# Patient Record
Sex: MALE | Race: BLACK OR AFRICAN AMERICAN | NOT HISPANIC OR LATINO | Employment: UNEMPLOYED | RURAL
[De-identification: names, ages, dates, MRNs, and addresses within clinical notes are randomized per-mention and may not be internally consistent; named-entity substitution may affect disease eponyms.]

---

## 2024-01-20 ENCOUNTER — HOSPITAL ENCOUNTER (INPATIENT)
Facility: HOSPITAL | Age: 44
LOS: 2 days | Discharge: HOME | DRG: 505 | End: 2024-01-23
Attending: EMERGENCY MEDICINE | Admitting: INTERNAL MEDICINE

## 2024-01-20 DIAGNOSIS — F17.200 TOBACCO DEPENDENCE: ICD-10-CM

## 2024-01-20 DIAGNOSIS — I73.9 PERIPHERAL VASCULAR DISEASE: Primary | ICD-10-CM

## 2024-01-20 DIAGNOSIS — R52 PAIN: ICD-10-CM

## 2024-01-20 DIAGNOSIS — M86.171 OTHER ACUTE OSTEOMYELITIS OF RIGHT FOOT: ICD-10-CM

## 2024-01-20 DIAGNOSIS — Z59.89 DOES NOT HAVE HEALTH INSURANCE: ICD-10-CM

## 2024-01-20 DIAGNOSIS — R07.9 CHEST PAIN: ICD-10-CM

## 2024-01-20 DIAGNOSIS — M79.671 FOOT PAIN, RIGHT: ICD-10-CM

## 2024-01-20 DIAGNOSIS — M86.071 ACUTE HEMATOGENOUS OSTEOMYELITIS OF RIGHT FOOT: ICD-10-CM

## 2024-01-20 DIAGNOSIS — M79.672 FOOT PAIN, LEFT: ICD-10-CM

## 2024-01-20 PROCEDURE — 82550 ASSAY OF CK (CPK): CPT | Performed by: EMERGENCY MEDICINE

## 2024-01-20 PROCEDURE — 85025 COMPLETE CBC W/AUTO DIFF WBC: CPT | Performed by: EMERGENCY MEDICINE

## 2024-01-20 PROCEDURE — 85730 THROMBOPLASTIN TIME PARTIAL: CPT | Performed by: EMERGENCY MEDICINE

## 2024-01-20 PROCEDURE — 85610 PROTHROMBIN TIME: CPT | Performed by: EMERGENCY MEDICINE

## 2024-01-20 PROCEDURE — 80053 COMPREHEN METABOLIC PANEL: CPT | Performed by: EMERGENCY MEDICINE

## 2024-01-20 PROCEDURE — 83874 ASSAY OF MYOGLOBIN: CPT | Performed by: EMERGENCY MEDICINE

## 2024-01-20 PROCEDURE — 99285 EMERGENCY DEPT VISIT HI MDM: CPT

## 2024-01-20 PROCEDURE — 99285 EMERGENCY DEPT VISIT HI MDM: CPT | Mod: ,,, | Performed by: EMERGENCY MEDICINE

## 2024-01-20 SDOH — SOCIAL DETERMINANTS OF HEALTH (SDOH): OTHER PROBLEMS RELATED TO HOUSING AND ECONOMIC CIRCUMSTANCES: Z59.89

## 2024-01-21 PROBLEM — F17.200 TOBACCO DEPENDENCE: Status: ACTIVE | Noted: 2024-01-21

## 2024-01-21 PROBLEM — M79.672 FOOT PAIN, LEFT: Status: ACTIVE | Noted: 2024-01-21

## 2024-01-21 PROBLEM — M86.071 ACUTE HEMATOGENOUS OSTEOMYELITIS OF RIGHT FOOT: Status: ACTIVE | Noted: 2024-01-21

## 2024-01-21 PROBLEM — I73.9 PERIPHERAL VASCULAR DISEASE: Status: ACTIVE | Noted: 2024-01-21

## 2024-01-21 PROBLEM — M79.671 FOOT PAIN, RIGHT: Status: ACTIVE | Noted: 2024-01-21

## 2024-01-21 LAB
ALBUMIN SERPL BCP-MCNC: 3.4 G/DL (ref 3.5–5)
ALBUMIN/GLOB SERPL: 1 {RATIO}
ALP SERPL-CCNC: 67 U/L (ref 45–115)
ALT SERPL W P-5'-P-CCNC: 25 U/L (ref 16–61)
ANION GAP SERPL CALCULATED.3IONS-SCNC: 9 MMOL/L (ref 7–16)
APTT PPP: 35.3 SECONDS (ref 25.2–37.3)
AST SERPL W P-5'-P-CCNC: 13 U/L (ref 15–37)
BASOPHILS # BLD AUTO: 0.03 K/UL (ref 0–0.2)
BASOPHILS NFR BLD AUTO: 0.3 % (ref 0–1)
BILIRUB SERPL-MCNC: 0.3 MG/DL (ref ?–1.2)
BUN SERPL-MCNC: 13 MG/DL (ref 7–18)
BUN/CREAT SERPL: 12 (ref 6–20)
CALCIUM SERPL-MCNC: 9 MG/DL (ref 8.5–10.1)
CHLORIDE SERPL-SCNC: 107 MMOL/L (ref 98–107)
CHOLEST SERPL-MCNC: 188 MG/DL (ref 0–200)
CHOLEST/HDLC SERPL: 4.8 {RATIO}
CK SERPL-CCNC: 227 U/L (ref 39–308)
CO2 SERPL-SCNC: 26 MMOL/L (ref 21–32)
CREAT SERPL-MCNC: 1.08 MG/DL (ref 0.7–1.3)
CRP SERPL-MCNC: <0.29 MG/DL (ref 0–0.8)
DIFFERENTIAL METHOD BLD: ABNORMAL
EGFR (NO RACE VARIABLE) (RUSH/TITUS): 88 ML/MIN/1.73M2
EOSINOPHIL # BLD AUTO: 0.19 K/UL (ref 0–0.5)
EOSINOPHIL NFR BLD AUTO: 2 % (ref 1–4)
ERYTHROCYTE [DISTWIDTH] IN BLOOD BY AUTOMATED COUNT: 14.1 % (ref 11.5–14.5)
ERYTHROCYTE [SEDIMENTATION RATE] IN BLOOD BY WESTERGREN METHOD: 4 MM/HR (ref 0–15)
GLOBULIN SER-MCNC: 3.3 G/DL (ref 2–4)
GLUCOSE SERPL-MCNC: 105 MG/DL (ref 74–106)
HCT VFR BLD AUTO: 40.3 % (ref 40–54)
HDLC SERPL-MCNC: 39 MG/DL (ref 40–60)
HGB BLD-MCNC: 13.5 G/DL (ref 13.5–18)
IMM GRANULOCYTES # BLD AUTO: 0.02 K/UL (ref 0–0.04)
IMM GRANULOCYTES NFR BLD: 0.2 % (ref 0–0.4)
INR BLD: 1
LDLC SERPL CALC-MCNC: 122 MG/DL
LDLC/HDLC SERPL: 3.1 {RATIO}
LYMPHOCYTES # BLD AUTO: 2.95 K/UL (ref 1–4.8)
LYMPHOCYTES NFR BLD AUTO: 30.5 % (ref 27–41)
MCH RBC QN AUTO: 28.4 PG (ref 27–31)
MCHC RBC AUTO-ENTMCNC: 33.5 G/DL (ref 32–36)
MCV RBC AUTO: 84.8 FL (ref 80–96)
MONOCYTES # BLD AUTO: 0.71 K/UL (ref 0–0.8)
MONOCYTES NFR BLD AUTO: 7.3 % (ref 2–6)
MPC BLD CALC-MCNC: 9.5 FL (ref 9.4–12.4)
MYOGLOBIN SERPL-MCNC: 51 NG/ML (ref 16–116)
NEUTROPHILS # BLD AUTO: 5.76 K/UL (ref 1.8–7.7)
NEUTROPHILS NFR BLD AUTO: 59.7 % (ref 53–65)
NONHDLC SERPL-MCNC: 149 MG/DL
NRBC # BLD AUTO: 0 X10E3/UL
NRBC, AUTO (.00): 0 %
PLATELET # BLD AUTO: 329 K/UL (ref 150–400)
POTASSIUM SERPL-SCNC: 3.5 MMOL/L (ref 3.5–5.1)
PROT SERPL-MCNC: 6.7 G/DL (ref 6.4–8.2)
PROTHROMBIN TIME: 13.1 SECONDS (ref 11.7–14.7)
RBC # BLD AUTO: 4.75 M/UL (ref 4.6–6.2)
SODIUM SERPL-SCNC: 138 MMOL/L (ref 136–145)
TRIGL SERPL-MCNC: 135 MG/DL (ref 35–150)
VLDLC SERPL-MCNC: 27 MG/DL
WBC # BLD AUTO: 9.66 K/UL (ref 4.5–11)

## 2024-01-21 PROCEDURE — 80061 LIPID PANEL: CPT | Performed by: INTERNAL MEDICINE

## 2024-01-21 PROCEDURE — 99222 1ST HOSP IP/OBS MODERATE 55: CPT | Mod: ,,, | Performed by: SURGERY

## 2024-01-21 PROCEDURE — 25000003 PHARM REV CODE 250: Performed by: INTERNAL MEDICINE

## 2024-01-21 PROCEDURE — 63600175 PHARM REV CODE 636 W HCPCS: Mod: JZ,JG | Performed by: INTERNAL MEDICINE

## 2024-01-21 PROCEDURE — 96372 THER/PROPH/DIAG INJ SC/IM: CPT | Performed by: EMERGENCY MEDICINE

## 2024-01-21 PROCEDURE — 86140 C-REACTIVE PROTEIN: CPT | Performed by: INTERNAL MEDICINE

## 2024-01-21 PROCEDURE — 63600175 PHARM REV CODE 636 W HCPCS: Performed by: EMERGENCY MEDICINE

## 2024-01-21 PROCEDURE — 85651 RBC SED RATE NONAUTOMATED: CPT | Performed by: INTERNAL MEDICINE

## 2024-01-21 PROCEDURE — 25000003 PHARM REV CODE 250: Performed by: EMERGENCY MEDICINE

## 2024-01-21 PROCEDURE — 87040 BLOOD CULTURE FOR BACTERIA: CPT | Performed by: EMERGENCY MEDICINE

## 2024-01-21 PROCEDURE — 99223 1ST HOSP IP/OBS HIGH 75: CPT | Mod: ,,, | Performed by: INTERNAL MEDICINE

## 2024-01-21 PROCEDURE — 11000001 HC ACUTE MED/SURG PRIVATE ROOM

## 2024-01-21 RX ORDER — ACETAMINOPHEN 325 MG/1
650 TABLET ORAL EVERY 4 HOURS PRN
Status: DISCONTINUED | OUTPATIENT
Start: 2024-01-21 | End: 2024-01-23 | Stop reason: HOSPADM

## 2024-01-21 RX ORDER — METRONIDAZOLE 500 MG/100ML
500 INJECTION, SOLUTION INTRAVENOUS
Status: DISCONTINUED | OUTPATIENT
Start: 2024-01-21 | End: 2024-01-23 | Stop reason: HOSPADM

## 2024-01-21 RX ORDER — PROMETHAZINE HYDROCHLORIDE 25 MG/ML
25 INJECTION, SOLUTION INTRAMUSCULAR; INTRAVENOUS
Status: COMPLETED | OUTPATIENT
Start: 2024-01-21 | End: 2024-01-21

## 2024-01-21 RX ORDER — MEPERIDINE HYDROCHLORIDE 25 MG/ML
75 INJECTION INTRAMUSCULAR; INTRAVENOUS; SUBCUTANEOUS
Status: COMPLETED | OUTPATIENT
Start: 2024-01-21 | End: 2024-01-21

## 2024-01-21 RX ORDER — CIPROFLOXACIN 2 MG/ML
400 INJECTION, SOLUTION INTRAVENOUS
Status: COMPLETED | OUTPATIENT
Start: 2024-01-21 | End: 2024-01-21

## 2024-01-21 RX ORDER — SODIUM CHLORIDE 0.9 % (FLUSH) 0.9 %
10 SYRINGE (ML) INJECTION EVERY 12 HOURS PRN
Status: DISCONTINUED | OUTPATIENT
Start: 2024-01-21 | End: 2024-01-23 | Stop reason: HOSPADM

## 2024-01-21 RX ORDER — ALUMINUM HYDROXIDE, MAGNESIUM HYDROXIDE, AND SIMETHICONE 1200; 120; 1200 MG/30ML; MG/30ML; MG/30ML
30 SUSPENSION ORAL 4 TIMES DAILY PRN
Status: DISCONTINUED | OUTPATIENT
Start: 2024-01-21 | End: 2024-01-23 | Stop reason: HOSPADM

## 2024-01-21 RX ORDER — SODIUM CHLORIDE 9 MG/ML
INJECTION, SOLUTION INTRAVENOUS CONTINUOUS
Status: DISCONTINUED | OUTPATIENT
Start: 2024-01-21 | End: 2024-01-21

## 2024-01-21 RX ORDER — CIPROFLOXACIN 2 MG/ML
400 INJECTION, SOLUTION INTRAVENOUS
Status: DISCONTINUED | OUTPATIENT
Start: 2024-01-21 | End: 2024-01-21

## 2024-01-21 RX ORDER — CIPROFLOXACIN 2 MG/ML
400 INJECTION, SOLUTION INTRAVENOUS
Status: DISCONTINUED | OUTPATIENT
Start: 2024-01-21 | End: 2024-01-23 | Stop reason: HOSPADM

## 2024-01-21 RX ORDER — ENOXAPARIN SODIUM 100 MG/ML
40 INJECTION SUBCUTANEOUS EVERY 24 HOURS
Status: DISCONTINUED | OUTPATIENT
Start: 2024-01-21 | End: 2024-01-23 | Stop reason: HOSPADM

## 2024-01-21 RX ORDER — HYDROCODONE BITARTRATE AND ACETAMINOPHEN 5; 325 MG/1; MG/1
1 TABLET ORAL EVERY 6 HOURS PRN
Status: DISCONTINUED | OUTPATIENT
Start: 2024-01-21 | End: 2024-01-23 | Stop reason: HOSPADM

## 2024-01-21 RX ORDER — SODIUM CHLORIDE 9 MG/ML
INJECTION, SOLUTION INTRAVENOUS CONTINUOUS
Status: DISPENSED | OUTPATIENT
Start: 2024-01-21 | End: 2024-01-22

## 2024-01-21 RX ORDER — ONDANSETRON HYDROCHLORIDE 2 MG/ML
4 INJECTION, SOLUTION INTRAVENOUS
Status: COMPLETED | OUTPATIENT
Start: 2024-01-21 | End: 2024-01-21

## 2024-01-21 RX ORDER — ONDANSETRON HYDROCHLORIDE 2 MG/ML
4 INJECTION, SOLUTION INTRAVENOUS EVERY 8 HOURS PRN
Status: DISCONTINUED | OUTPATIENT
Start: 2024-01-21 | End: 2024-01-23 | Stop reason: HOSPADM

## 2024-01-21 RX ORDER — MORPHINE SULFATE 4 MG/ML
4 INJECTION, SOLUTION INTRAMUSCULAR; INTRAVENOUS
Status: COMPLETED | OUTPATIENT
Start: 2024-01-21 | End: 2024-01-21

## 2024-01-21 RX ORDER — DOXYCYCLINE 100 MG/1
100 CAPSULE ORAL 2 TIMES DAILY
Qty: 20 CAPSULE | Refills: 0 | Status: SHIPPED | OUTPATIENT
Start: 2024-01-21 | End: 2024-01-31

## 2024-01-21 RX ORDER — NALOXONE HCL 0.4 MG/ML
0.02 VIAL (ML) INJECTION
Status: DISCONTINUED | OUTPATIENT
Start: 2024-01-21 | End: 2024-01-23 | Stop reason: HOSPADM

## 2024-01-21 RX ADMIN — MORPHINE SULFATE 4 MG: 4 INJECTION, SOLUTION INTRAMUSCULAR; INTRAVENOUS at 10:01

## 2024-01-21 RX ADMIN — SODIUM CHLORIDE: 9 INJECTION, SOLUTION INTRAVENOUS at 02:01

## 2024-01-21 RX ADMIN — MEPERIDINE HYDROCHLORIDE 75 MG: 25 INJECTION, SOLUTION INTRAMUSCULAR; INTRAVENOUS; SUBCUTANEOUS at 02:01

## 2024-01-21 RX ADMIN — CIPROFLOXACIN 400 MG: 2 INJECTION, SOLUTION INTRAVENOUS at 10:01

## 2024-01-21 RX ADMIN — CIPROFLOXACIN 400 MG: 2 INJECTION, SOLUTION INTRAVENOUS at 09:01

## 2024-01-21 RX ADMIN — HYDROCODONE BITARTRATE AND ACETAMINOPHEN 1 TABLET: 5; 325 TABLET ORAL at 09:01

## 2024-01-21 RX ADMIN — VANCOMYCIN HYDROCHLORIDE 2000 MG: 500 INJECTION, POWDER, LYOPHILIZED, FOR SOLUTION INTRAVENOUS at 11:01

## 2024-01-21 RX ADMIN — METRONIDAZOLE 500 MG: 5 INJECTION, SOLUTION INTRAVENOUS at 09:01

## 2024-01-21 RX ADMIN — ONDANSETRON 4 MG: 2 INJECTION INTRAMUSCULAR; INTRAVENOUS at 10:01

## 2024-01-21 RX ADMIN — ENOXAPARIN SODIUM 40 MG: 100 INJECTION SUBCUTANEOUS at 06:01

## 2024-01-21 RX ADMIN — PROMETHAZINE HYDROCHLORIDE 25 MG: 25 INJECTION INTRAMUSCULAR; INTRAVENOUS at 02:01

## 2024-01-21 RX ADMIN — METRONIDAZOLE 500 MG: 5 INJECTION, SOLUTION INTRAVENOUS at 02:01

## 2024-01-21 NOTE — CONSULTS
[facility] - Emergency Department  General Surgery  Consult Note    Patient Name: Christian Stuart  MRN: [id number]  Code Status: Full Code  Admission Date: 1/20/2024  Hospital Length of Stay: 0 days  Attending Physician: Blake Wu MD  Primary Care Provider: Jacqueline Primary Doctor    Patient information was obtained from patient, ER records, and primary team.     Inpatient consult to General surgery  Consult performed by: Sky Riggins MD  Consult ordered by: Julio César Temple MD  Reason for consult: Cellulitis right foot possible osteomyelitis (indicated by plain films)  Assessment/Recommendations: MRI right foot, with possible subsequent debridement/partial amputation of involved toes.    IV antibiotics      Subjective:     Principal Problem: Acute hematogenous osteomyelitis of right foot    History of Present Illness: This 53-year-old male patient presented to the emergency department today with some plain of bilateral foot pain.  He has recently moved to [facility] [place].  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  There was no history of diabetes,  however.  The patient reports that he is having very similar pain to that prior to his previous left foot TMA.  He has noticed ulcerations over the right 5th toe and thickening of the skin of the great toe.  X-rays of the foot reveal possible osteomyelitis of the distal phalanx on these 2 toes in addition to possibly the 3rd toe distal phalanx.  Surgery was asked to evaluate manage further.    No current facility-administered medications on file prior to encounter.     No current outpatient medications on file prior to encounter.       Review of patient's allergies indicates:   Allergen Reactions    Penicillins Hives       No past medical history on file.  No past surgical history on file.  Family History    None       Tobacco Use    Smoking status: Not on file    Smokeless tobacco: Not on file   Substance and Sexual  Activity    Alcohol use: Not on file    Drug use: Not on file    Sexual activity: Not on file     Review of Systems   All other systems reviewed and are negative.    Objective:     Vital Signs (Most Recent):  Temp: 97.9 °F (36.6 °C) (01/21/24 0304)  Pulse: (!) 58 (01/21/24 1336)  Resp: 20 (01/21/24 1005)  BP: 126/73 (01/21/24 1522)  SpO2: 97 % (01/21/24 1336) Vital Signs (24h Range):  Temp:  [97.9 °F (36.6 °C)-98 °F (36.7 °C)] 97.9 °F (36.6 °C)  Pulse:  [52-67] 58  Resp:  [16-20] 20  SpO2:  [94 %-98 %] 97 %  BP: (106-126)/(61-79) 126/73     Weight: 77.1 kg (170 lb)  Body mass index is 21.83 kg/m².     Physical Exam  Cardiovascular:      Rate and Rhythm: Normal rate.   Pulmonary:      Effort: Pulmonary effort is normal. No respiratory distress.   Abdominal:      General: Abdomen is flat.      Palpations: Abdomen is soft.      Tenderness: There is no abdominal tenderness.   Musculoskeletal:         General: No swelling.   Skin:     Comments: Left transmetatarsal amputation without apparent drainage or ulceration.  Mildly tender to palpation along the plantar surface but no evidence of fluctuance.  Minimal erythema of the left foot.      Right great toe with hyperkeratosis but no drainage.  Right 5th toe with mild ulceration overlying dorsal surface.  Mild erythema of the foot.  Hyper pigmentation noted over the 3rd toe without callus or ulceration.   Neurological:      General: No focal deficit present.      Mental Status: He is alert.          I have reviewed all pertinent lab results within the past 24 hours.  CBC:   Recent Labs   Lab 01/20/24  2349   WBC 9.66   RBC 4.75   HGB 13.5   HCT 40.3      MCV 84.8   MCH 28.4   MCHC 33.5     BMP:   Recent Labs   Lab 01/20/24  2349         K 3.5      CO2 26   BUN 13   CREATININE 1.08   CALCIUM 9.0     CMP:   Recent Labs   Lab 01/20/24  2349      CALCIUM 9.0   ALBUMIN 3.4*   PROT 6.7      K 3.5   CO2 26      BUN 13   CREATININE 1.08    ALKPHOS 67   ALT 25   AST 13*   BILITOT 0.3     LFTs:   Recent Labs   Lab 01/20/24  2349   ALT 25   AST 13*   ALKPHOS 67   BILITOT 0.3   PROT 6.7   ALBUMIN 3.4*       Significant Diagnostics:  I have reviewed all pertinent imaging results/findings within the past 24 hours.    Assessment/Plan:     * Acute hematogenous osteomyelitis of right foot  Involving the distal phalanges of the great toe 5th toe right foot    Plan an MRI to further evaluate, prior to surgical debridement with potential partial amputations of the involved toes.  Agree with IV antibiotics.      VTE Risk Mitigation (From admission, onward)           Ordered     enoxaparin injection 40 mg  Daily         01/21/24 1222     IP VTE HIGH RISK PATIENT  Once         01/21/24 1222     Place sequential compression device  Until discontinued         01/21/24 1222                    Thank you for your consult. I will follow-up with patient. Please contact us if you have any additional questions.    Sky Riggins MD  General Surgery  [facility] - Emergency Department

## 2024-01-21 NOTE — SUBJECTIVE & OBJECTIVE
No past medical history on file.    No past surgical history on file.    Review of patient's allergies indicates:   Allergen Reactions    Penicillins Hives       No current facility-administered medications on file prior to encounter.     No current outpatient medications on file prior to encounter.     Family History    None       Tobacco Use    Smoking status: Not on file    Smokeless tobacco: Not on file   Substance and Sexual Activity    Alcohol use: Not on file    Drug use: Not on file    Sexual activity: Not on file     Review of Systems   Constitutional:  Positive for fatigue and fever.     Objective:     Vital Signs (Most Recent):  Temp: 97.9 °F (36.6 °C) (01/21/24 0304)  Pulse: (!) 52 (01/21/24 0456)  Resp: 20 (01/21/24 1005)  BP: 115/64 (01/21/24 0456)  SpO2: 96 % (01/21/24 0456) Vital Signs (24h Range):  Temp:  [97.9 °F (36.6 °C)-98 °F (36.7 °C)] 97.9 °F (36.6 °C)  Pulse:  [52-67] 52  Resp:  [16-20] 20  SpO2:  [94 %-98 %] 96 %  BP: (106-115)/(61-68) 115/64     Weight: 77.1 kg (170 lb)  Body mass index is 21.83 kg/m².     Physical Exam  Vitals and nursing note reviewed.   Constitutional:       Appearance: Normal appearance.   HENT:      Head: Normocephalic and atraumatic.      Nose: Nose normal.      Mouth/Throat:      Mouth: Mucous membranes are moist.   Eyes:      Extraocular Movements: Extraocular movements intact.   Cardiovascular:      Rate and Rhythm: Normal rate and regular rhythm.      Pulses: Normal pulses.      Heart sounds: Normal heart sounds.   Pulmonary:      Effort: Pulmonary effort is normal.      Breath sounds: Normal breath sounds.   Abdominal:      General: Bowel sounds are normal.      Palpations: Abdomen is soft.   Musculoskeletal:      Cervical back: Normal range of motion.      Right foot: Tenderness present. Abnormal pulse.      Left foot: Deformity present.   Skin:     General: Skin is warm.   Neurological:      General: No focal deficit present.      Mental Status: He is alert and  oriented to person, place, and time. Mental status is at baseline.   Psychiatric:         Mood and Affect: Mood normal.         Behavior: Behavior normal.                Significant Labs: All pertinent labs within the past 24 hours have been reviewed.    Significant Imaging: I have reviewed all pertinent imaging results/findings within the past 24 hours.

## 2024-01-21 NOTE — SUBJECTIVE & OBJECTIVE
No current facility-administered medications on file prior to encounter.     No current outpatient medications on file prior to encounter.       Review of patient's allergies indicates:   Allergen Reactions    Penicillins Hives       No past medical history on file.  No past surgical history on file.  Family History    None       Tobacco Use    Smoking status: Not on file    Smokeless tobacco: Not on file   Substance and Sexual Activity    Alcohol use: Not on file    Drug use: Not on file    Sexual activity: Not on file     Review of Systems   All other systems reviewed and are negative.    Objective:     Vital Signs (Most Recent):  Temp: 97.9 °F (36.6 °C) (01/21/24 0304)  Pulse: (!) 58 (01/21/24 1336)  Resp: 20 (01/21/24 1005)  BP: 126/73 (01/21/24 1522)  SpO2: 97 % (01/21/24 1336) Vital Signs (24h Range):  Temp:  [97.9 °F (36.6 °C)-98 °F (36.7 °C)] 97.9 °F (36.6 °C)  Pulse:  [52-67] 58  Resp:  [16-20] 20  SpO2:  [94 %-98 %] 97 %  BP: (106-126)/(61-79) 126/73     Weight: 77.1 kg (170 lb)  Body mass index is 21.83 kg/m².     Physical Exam  Cardiovascular:      Rate and Rhythm: Normal rate.   Pulmonary:      Effort: Pulmonary effort is normal. No respiratory distress.   Abdominal:      General: Abdomen is flat.      Palpations: Abdomen is soft.      Tenderness: There is no abdominal tenderness.   Musculoskeletal:         General: No swelling.   Skin:     Comments: Left transmetatarsal amputation without apparent drainage or ulceration.  Mildly tender to palpation along the plantar surface but no evidence of fluctuance.  Minimal erythema of the left foot.      Right great toe with hyperkeratosis but no drainage.  Right 5th toe with mild ulceration overlying dorsal surface.  Mild erythema of the foot.  Hyper pigmentation noted over the 3rd toe without callus or ulceration.   Neurological:      General: No focal deficit present.      Mental Status: He is alert.          I have reviewed all pertinent lab results within  the past 24 hours.  CBC:   Recent Labs   Lab 01/20/24  2349   WBC 9.66   RBC 4.75   HGB 13.5   HCT 40.3      MCV 84.8   MCH 28.4   MCHC 33.5     BMP:   Recent Labs   Lab 01/20/24  2349         K 3.5      CO2 26   BUN 13   CREATININE 1.08   CALCIUM 9.0     CMP:   Recent Labs   Lab 01/20/24  2349      CALCIUM 9.0   ALBUMIN 3.4*   PROT 6.7      K 3.5   CO2 26      BUN 13   CREATININE 1.08   ALKPHOS 67   ALT 25   AST 13*   BILITOT 0.3     LFTs:   Recent Labs   Lab 01/20/24  2349   ALT 25   AST 13*   ALKPHOS 67   BILITOT 0.3   PROT 6.7   ALBUMIN 3.4*       Significant Diagnostics:  I have reviewed all pertinent imaging results/findings within the past 24 hours.

## 2024-01-21 NOTE — ASSESSMENT & PLAN NOTE
Bilateral LE arterial duplex showed possible stenosis in popliteal region of LLE.   Defer to general surgery for further evaluation.  Outpatient referral to vascular surgery.   Check lipid panel.

## 2024-01-21 NOTE — H&P
[facility] - Emergency Department  Hospital Medicine  History & Physical    Patient Name: Christian Stuart  MRN: [id number]  Patient Class: IP- Inpatient  Admission Date: 1/20/2024  Attending Physician: Blake Wu MD   Primary Care Provider: Jacqueline Primary Doctor         Patient information was obtained from patient, past medical records, and ER records.     Subjective:     Principal Problem:Acute hematogenous osteomyelitis of right foot    Chief Complaint:   Chief Complaint   Patient presents with    Foot Pain     Chronic foot pain, worse for last week. Reports partial left foot amputation years ago due to cellulitis. States pain worsened in right foot, states possibly treated at [facility] ED earlier this month and finished abx over 1 week ago.         HPI: Mr. Stuart is a 43 Y O male with PMH of tobacco dependence, left foot OM s/p TMA in 2022 in [place], [place] region, who presented to ED with the complain of right foot pain with discoloration of his toes. He noted right foot pain, mostly on putting weight on it around 3-4 weeks ago when he went to [facility] and was prescribed PO antibiotics x 1 week. He completed the course but his symptoms did not improve. Over the last 2 weeks he has noted darkening/discoloration of his toes. He describes pin and needles sensation in both feet. He reports subjective fever, denies chills. No chest pain, shortness of breath. He has history of left foot OM which eventually resulted in transmetatarsal amputation in [place], [place] in 2022. He denies having any blockages of blood vessels in lower extremities, no history of diabetes. No history of drug use, he smokes around 6-8 cigarettes daily.     No past medical history on file.    No past surgical history on file.    Review of patient's allergies indicates:   Allergen Reactions    Penicillins Hives       No current facility-administered medications on file prior to encounter.     No current outpatient medications on file prior to  encounter.     Family History    None       Tobacco Use    Smoking status: Not on file    Smokeless tobacco: Not on file   Substance and Sexual Activity    Alcohol use: Not on file    Drug use: Not on file    Sexual activity: Not on file     Review of Systems   Constitutional:  Positive for fatigue and fever.     Objective:     Vital Signs (Most Recent):  Temp: 97.9 °F (36.6 °C) (01/21/24 0304)  Pulse: (!) 52 (01/21/24 0456)  Resp: 20 (01/21/24 1005)  BP: 115/64 (01/21/24 0456)  SpO2: 96 % (01/21/24 0456) Vital Signs (24h Range):  Temp:  [97.9 °F (36.6 °C)-98 °F (36.7 °C)] 97.9 °F (36.6 °C)  Pulse:  [52-67] 52  Resp:  [16-20] 20  SpO2:  [94 %-98 %] 96 %  BP: (106-115)/(61-68) 115/64     Weight: 77.1 kg (170 lb)  Body mass index is 21.83 kg/m².     Physical Exam  Vitals and nursing note reviewed.   Constitutional:       Appearance: Normal appearance.   HENT:      Head: Normocephalic and atraumatic.      Nose: Nose normal.      Mouth/Throat:      Mouth: Mucous membranes are moist.   Eyes:      Extraocular Movements: Extraocular movements intact.   Cardiovascular:      Rate and Rhythm: Normal rate and regular rhythm.      Pulses: Normal pulses.      Heart sounds: Normal heart sounds.   Pulmonary:      Effort: Pulmonary effort is normal.      Breath sounds: Normal breath sounds.   Abdominal:      General: Bowel sounds are normal.      Palpations: Abdomen is soft.   Musculoskeletal:      Cervical back: Normal range of motion.      Right foot: Tenderness present. Abnormal pulse.      Left foot: Deformity present.   Skin:     General: Skin is warm.   Neurological:      General: No focal deficit present.      Mental Status: He is alert and oriented to person, place, and time. Mental status is at baseline.   Psychiatric:         Mood and Affect: Mood normal.         Behavior: Behavior normal.                Significant Labs: All pertinent labs within the past 24 hours have been reviewed.    Significant Imaging: I have  reviewed all pertinent imaging results/findings within the past 24 hours.  Assessment/Plan:     * Acute hematogenous osteomyelitis of right foot  Presented with right foot pain and discoloration of toes x 3-4 weeks.  No leukocytosis/fever or other SIRS criteria met on admission.   X-ray showed changes suggestive of osteomyelitis involving 1st, 3rd and 5th digit.  Inflammatory markers, blood cultures pending.   Check HBA1C.   General surgery consulted.   Started on IV vancomycin, ciprofloxacin and metronidazole (allergy to penicillin).         Peripheral vascular disease  Bilateral LE arterial duplex showed possible stenosis in popliteal region of LLE.   Defer to general surgery for further evaluation.  Outpatient referral to vascular surgery.   Check lipid panel.         Tobacco dependence  Dangers of cigarette smoking were reviewed with patient in detail. Patient was Counseled for 3-10 minutes. Nicotine replacement options were discussed. Nicotine replacement was discussed- not prescribed per patient's request      VTE Risk Mitigation (From admission, onward)           Ordered     enoxaparin injection 40 mg  Daily         01/21/24 1222     IP VTE HIGH RISK PATIENT  Once         01/21/24 1222     Place sequential compression device  Until discontinued         01/21/24 1222                                    ADDI WINKLER MD  Department of Hospital Medicine  [facility] - Emergency Department

## 2024-01-21 NOTE — ED PROVIDER NOTES
Encounter Date: 1/20/2024       History     Chief Complaint   Patient presents with    Foot Pain     Chronic foot pain, worse for last week. Reports partial left foot amputation years ago due to cellulitis. States pain worsened in right foot, states possibly treated at [facility] ED earlier this month and finished abx over 1 week ago.      Mr Stuart presents to ED c complaints of bilateral foot pain onset 5 days ago. He states pain is pressure and he cannot control the pain c OTC medications. He previously had a partial left foot years prior due to cellulitis. He states his left foot hurts more than his right and his left foot is cooler to touch than his left.      Review of patient's allergies indicates:   Allergen Reactions    Penicillins Hives     No past medical history on file.  No past surgical history on file.  No family history on file.     Review of Systems   Constitutional:  Positive for activity change.       Physical Exam     Initial Vitals [01/20/24 2256]   BP Pulse Resp Temp SpO2   106/68 67 18 98 °F (36.7 °C) 98 %      MAP       --         Physical Exam    Vitals reviewed.  Constitutional: He appears well-developed.   HENT:   Head: Normocephalic.   Eyes: Pupils are equal, round, and reactive to light.   Neck:   Normal range of motion.  Cardiovascular:  Normal rate.           Pulmonary/Chest: Breath sounds normal.   Abdominal: Abdomen is soft.   Musculoskeletal:         General: Normal range of motion.      Cervical back: Normal range of motion.      Comments: Left foot is cool to touch. Decreased pulses to left lower extremity.     Neurological: He is alert and oriented to person, place, and time. GCS eye subscore is 4. GCS verbal subscore is 5. GCS motor subscore is 6.   Skin: Skin is warm.   Psychiatric: He has a normal mood and affect. His behavior is normal. Thought content normal.         Medical Screening Exam   See Full Note    ED Course   Procedures  Labs Reviewed   COMPREHENSIVE METABOLIC PANEL -  Abnormal; Notable for the following components:       Result Value    Albumin 3.4 (*)     AST 13 (*)     All other components within normal limits   CBC WITH DIFFERENTIAL - Abnormal; Notable for the following components:    Monocytes % 7.3 (*)     All other components within normal limits   APTT - Normal   PROTIME-INR - Normal   CK - Normal   MYOGLOBIN, SERUM - Normal   CULTURE, BLOOD   CULTURE, BLOOD   CBC W/ AUTO DIFFERENTIAL    Narrative:     The following orders were created for panel order CBC auto differential.  Procedure                               Abnormality         Status                     ---------                               -----------         ------                     CBC with Differential[[id number]]       Abnormal            Final result                 Please view results for these tests on the individual orders.   EXTRA TUBES    Narrative:     The following orders were created for panel order EXTRA TUBES.  Procedure                               Abnormality         Status                     ---------                               -----------         ------                     Light Green Top Hold[[id number]]                            In process                   Please view results for these tests on the individual orders.   LIGHT GREEN TOP HOLD          Imaging Results              X-Ray Foot Complete Bilateral (Final result)  Result time 01/21/24 09:13:55      Final result by Juan Owens DO (01/21/24 09:13:55)                   Impression:      Findings as detailed above.    Point of Service: [facility]      Electronically signed by: Juan Owens  Date:    01/21/2024  Time:    09:13               Narrative:    EXAMINATION:  XR FOOT COMPLETE 3 VIEW BILATERAL    CLINICAL HISTORY:  Foot swelling, nondiabetic, osteomyelitis suspected;cellulitis, bilateral with foot pain;osteo suspected;    COMPARISON:  None    TECHNIQUE:  Frontal, lateral, and oblique views of the  bilateral foot.    FINDINGS:  Amputation change of the left foot to the midfoot level.  Soft tissue swelling of the left foot.    There is erosive change about the distal aspect of the 5th proximal phalanx on the right suspicious for sequela of osteomyelitis.  There is a superficial soft tissue ulcer at this level.  There is erosive change of the distal phalanx of the 1st digit of the right foot suspicious for osteomyelitis with overlying soft tissue ulceration in scattered subcutaneous emphysema.  There is a linear foreign body within the plantar aspect of the foot soft tissues which measures up to 1 cm and is located adjacent to the proximal 3rd metatarsal.  There is erosive change of the distal aspect of the distal phalanx of the 3rd digit on the right suspicious for osteomyelitis.  There is heterogeneous sclerosis of the talus on the right which may reflect sequela of osteonecrosis but is nonspecific.  Consider MRI of the right foot for further evaluation.                                       US Lower Extremity Arteries Bilateral (Final result)  Result time 01/21/24 09:05:04      Final result by Levi Cortez MD (01/21/24 09:05:04)                   Impression:      No vascular occlusion.  Some diminished velocity left popliteal artery may reflect stenosis potentially.      Electronically signed by: Levi Cortez  Date:    01/21/2024  Time:    09:05               Narrative:    EXAMINATION:  US LOWER EXTREMITY ARTERIES BILATERAL    CLINICAL HISTORY:  Pain, unspecified    TECHNIQUE:  Bilateral spectral, color and grayscale images of the large arteries of both lower extremities were performed.  Real-time ultrasound images captured and stored.    COMPARISON:  None    FINDINGS:  No vascular occlusion identified.  There is decreased velocity of the left midportion popliteal artery compared with more proximal left popliteal artery which may reflect a stenosis.  Predominantly triphasic waveforms are seen proximally with  some spectral broadening distally.                                       Medications   vancomycin (VANCOCIN) 2,000 mg in dextrose 5 % (D5W) 500 mL IVPB (2,000 mg Intravenous New Bag 1/21/24 1119)   meperidine (PF) injection 75 mg (75 mg Intramuscular Given 1/21/24 0237)   promethazine injection 25 mg (25 mg Intramuscular Given 1/21/24 0239)   morphine injection 4 mg (4 mg Intravenous Given 1/21/24 1005)   ondansetron injection 4 mg (4 mg Intravenous Given 1/21/24 1005)   ciprofloxacin (CIPRO)400mg/200ml D5W IVPB 400 mg (0 mg Intravenous Stopped 1/21/24 1105)     Medical Decision Making  MDM    Patient presents for emergent evaluation of acute right foot pain that poses a threat to life and/or bodily function.    In the ED patient found to have acute osteomyelitis right foot.    I ordered labs and personally reviewed them.  Labs significant for white count normal.    I ordered X-rays and personally reviewed them and reviewed the radiologist interpretation.  Xray significant for consistent with osteomyelitis right 5th toe.      Admission MDM  I discussed the patient presentation and findings with the consultant for \[facility] [place] [facility]\"" medicine general surgery (speciality).    Patient was managed in the ED with IV vancomycin ciprofloxacin.    The response to treatment was stable.    Patient required emergent consultation to Hospital Medicine (admitting physician) for admission.     Amount and/or Complexity of Data Reviewed  Labs: ordered.    Risk  Prescription drug management.  Decision regarding hospitalization.              Attending Attestation:     Physician Attestation Statement for NP/PA:   I personally made/approved the management plan and take responsibility for the patient management.              ED Course as of 01/21/24 1216   Sun Jan 21, 2024   0607 Sign out.  Vasculopathy.  Will discuss with Dr. Riggins [PK]   0616 Dr. Riggins will see [PK]   8042 admit patient for osteomyelitis of the right foot. Patient presents with  some worsening pain in the right foot over the past couple weeks and a new ulcer overlying the right 5th toe. He has had some oral antibiotics at home but it was getting worse. X-ray shows some signs of osteomyelitis on the right 5th toe that is underlying the new ulcer. Patient has some chronic appearing cyanotic/peripheral arterial disease type appearance to his toes on the right but Dr. Riggins did see the patient in the ED and if x-rays had been negative and we were not concerned about osteomyelitis he had been thinking about discharging the patient. So there has no acute surgical intervention that he thinks of right now but since the x-ray showed the erosions can consider adding MRI or CT. I'm thinking the patient actually has legit osteomyelitis and needs IV antibiotics. The patient has had contralateral transmetatarsal amputation a year ago in [place]. He just moved back to [facility] where he is from [PK]      ED Course User Index  [PK] Julio César Temple MD                             Clinical Impression:   Final diagnoses:  [R52] Pain  [I73.9] Peripheral vascular disease (Primary)  [M79.671] Foot pain, right  [M79.672] Foot pain, left  [M86.171] Other acute osteomyelitis of right foot        ED Disposition Condition    Admit Stable                Weathers, JANICE Angeles  01/20/24 0445       Julio César Temple MD  01/21/24 9791

## 2024-01-21 NOTE — PROGRESS NOTES
"Pharmacokinetic Initial Assessment: IV Vancomycin    Assessment/Plan:    Initiate intravenous vancomycin with loading dose of 2000 mg once followed by a maintenance dose of vancomycin 1500 mg IV every 12 hours  Desired empiric serum trough concentration is 15 to 20 mcg/mL  Draw vancomycin trough level 30 min prior to third dose on 1.22 at approximately 1330  Pharmacy will continue to follow and monitor vancomycin.      Please contact pharmacy at extension [phone] with any questions regarding this assessment.     Thank you for the consult,   Connie Mao       Patient brief summary:  Christian Stuart is a 43 y.o. male initiated on antimicrobial therapy with IV Vancomycin for treatment of suspected bone/joint infection    Drug Allergies:   Review of patient's allergies indicates:   Allergen Reactions    Penicillins Hives       Actual Body Weight:   77.1 kg     Renal Function:   Estimated Creatinine Clearance: 97.2 mL/min (based on SCr of 1.08 mg/dL).,     Dialysis Method (if applicable):  N/A    CBC (last 72 hours):  Recent Labs   Lab Result Units 01/20/24  2349   WBC K/uL 9.66   Hemoglobin g/dL 13.5   Hematocrit % 40.3   Platelet Count K/uL 329   Lymphocytes % % 30.5   Monocytes % % 7.3*   Eosinophils % % 2.0   Basophils % % 0.3   Diff Type  Auto       Metabolic Panel (last 72 hours):  Recent Labs   Lab Result Units 01/20/24  2349   Sodium mmol/L 138   Potassium mmol/L 3.5   Chloride mmol/L 107   CO2 mmol/L 26   Glucose mg/dL 105   BUN mg/dL 13   Creatinine mg/dL 1.08   Albumin g/dL 3.4*   Bilirubin, Total mg/dL 0.3   Alk Phos U/L 67   AST U/L 13*   ALT U/L 25       Drug levels (last 3 results):  No results for input(s): "VANCOMYCINRA", "VANCORANDOM", "VANCOMYCINPE", "VANCOPEAK", "VANCOMYCINTR", "VANCOTROUGH" in the last 72 hours.    Microbiologic Results:  Microbiology Results (last 7 days)       Procedure Component Value Units Date/Time    Blood culture #2 [[id number]] Collected: 01/21/24 0959    Order Status: Sent " Specimen: Blood Updated: 01/21/24 1004    Blood culture #1 [[id number]] Collected: 01/21/24 0950    Order Status: Sent Specimen: Blood Updated: 01/21/24 1004

## 2024-01-21 NOTE — HPI
This 53-year-old male patient presented to the emergency department today with some plain of bilateral foot pain.  [place]  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  There was no history of diabetes,  however.  The patient reports that he is having very similar pain to that prior to his previous left foot TMA.  He has noticed ulcerations over the right 5th toe and thickening of the skin of the great toe.  X-rays of the foot reveal possible osteomyelitis of the distal phalanx on these 2 toes in addition to possibly the 3rd toe distal phalanx.  Surgery was asked to evaluate manage further.

## 2024-01-21 NOTE — ASSESSMENT & PLAN NOTE
Involving the distal phalanges of the great toe 5th toe right foot    Plan an MRI to further evaluate, prior to surgical debridement with potential partial amputations of the involved toes.  Agree with IV antibiotics.

## 2024-01-21 NOTE — HPI
Mr. Stuart is a 43 Y O male with PMH of tobacco dependence, left foot OM s/p TMA in 2022 in [place], [place], who presented to ED with the complain of right foot pain with discoloration of his toes. He noted right foot pain, mostly on putting weight on it around 3-4 weeks ago when he went to [facility] and was prescribed PO antibiotics x 1 week. He completed the course but his symptoms did not improve. Over the last 2 weeks he has noted darkening/discoloration of his toes. He describes pin and needles sensation in both feet. He reports subjective fever, denies chills. No chest pain, shortness of breath. He has history of left foot OM which eventually resulted in transmetatarsal amputation in [place], [place] in 2022. He denies having any blockages of blood vessels in lower extremities, no history of diabetes. No history of drug use, he smokes around 6-8 cigarettes daily.

## 2024-01-21 NOTE — ED NOTES
Francisco with MRI called his supervisor about scheduled MRI on right foot.  Will wait and do first thing in the morning.

## 2024-01-21 NOTE — ASSESSMENT & PLAN NOTE
Presented with right foot pain and discoloration of toes x 3-4 weeks.  No leukocytosis/fever or other SIRS criteria met on admission.   X-ray showed changes suggestive of osteomyelitis involving 1st, 3rd and 5th digit.  Inflammatory markers, blood cultures pending.   Check HBA1C.   General surgery consulted.   Started on IV vancomycin, ciprofloxacin and metronidazole (allergy to penicillin).

## 2024-01-21 NOTE — DISCHARGE INSTRUCTIONS
TAKE DOXYCYCLINE AS DIRECTED.  FOLLOW UP WITH VASCULAR SURGERY.  A REFERRAL HAS BEEN MADE FOR YOU IN THIS REGARD.  RETURN TO THE EMERGENCY DEPARTMENT AS NEEDED.        Hospitalist Discharge orders  *Notify your healthcare provider if you experience any of the following: temperature >100.4  * Notify your healthcare provider if you experience any of the following: redness, tenderness.    *Notify your healthcare provider if you experience any of the following: difficulty breathing or     increased cough.  *Notify your physician if you experience any persistent nausea, vomiting, or diarrhea or headache  *Notify your physician if you experience any of the following:severe uncontrolled pain;worsening rash, increased confusion or weakness; dizziness, lightheadedness or visual disturbances

## 2024-01-22 ENCOUNTER — ANESTHESIA EVENT (OUTPATIENT)
Dept: SURGERY | Facility: HOSPITAL | Age: 44
DRG: 505 | End: 2024-01-22

## 2024-01-22 ENCOUNTER — ANESTHESIA (OUTPATIENT)
Dept: SURGERY | Facility: HOSPITAL | Age: 44
DRG: 505 | End: 2024-01-22

## 2024-01-22 PROBLEM — Z59.89 DOES NOT HAVE HEALTH INSURANCE: Status: ACTIVE | Noted: 2024-01-22

## 2024-01-22 LAB
ANION GAP SERPL CALCULATED.3IONS-SCNC: 4 MMOL/L (ref 7–16)
BASOPHILS # BLD AUTO: 0.03 K/UL (ref 0–0.2)
BASOPHILS NFR BLD AUTO: 0.6 % (ref 0–1)
BUN SERPL-MCNC: 14 MG/DL (ref 7–18)
BUN/CREAT SERPL: 11 (ref 6–20)
CALCIUM SERPL-MCNC: 8.3 MG/DL (ref 8.5–10.1)
CHLORIDE SERPL-SCNC: 106 MMOL/L (ref 98–107)
CO2 SERPL-SCNC: 31 MMOL/L (ref 21–32)
CREAT SERPL-MCNC: 1.24 MG/DL (ref 0.7–1.3)
DIFFERENTIAL METHOD BLD: ABNORMAL
EGFR (NO RACE VARIABLE) (RUSH/TITUS): 74 ML/MIN/1.73M2
EOSINOPHIL # BLD AUTO: 0.2 K/UL (ref 0–0.5)
EOSINOPHIL NFR BLD AUTO: 3.7 % (ref 1–4)
ERYTHROCYTE [DISTWIDTH] IN BLOOD BY AUTOMATED COUNT: 14.2 % (ref 11.5–14.5)
EST. AVERAGE GLUCOSE BLD GHB EST-MCNC: 111 MG/DL
GLUCOSE SERPL-MCNC: 93 MG/DL (ref 74–106)
HBA1C MFR BLD HPLC: 5.5 % (ref 4.5–6.6)
HCT VFR BLD AUTO: 42.9 % (ref 40–54)
HGB BLD-MCNC: 13.8 G/DL (ref 13.5–18)
IMM GRANULOCYTES # BLD AUTO: 0.01 K/UL (ref 0–0.04)
IMM GRANULOCYTES NFR BLD: 0.2 % (ref 0–0.4)
LYMPHOCYTES # BLD AUTO: 3.33 K/UL (ref 1–4.8)
LYMPHOCYTES NFR BLD AUTO: 61.3 % (ref 27–41)
MCH RBC QN AUTO: 28.2 PG (ref 27–31)
MCHC RBC AUTO-ENTMCNC: 32.2 G/DL (ref 32–36)
MCV RBC AUTO: 87.7 FL (ref 80–96)
MONOCYTES # BLD AUTO: 0.4 K/UL (ref 0–0.8)
MONOCYTES NFR BLD AUTO: 7.4 % (ref 2–6)
MPC BLD CALC-MCNC: 9.3 FL (ref 9.4–12.4)
NEUTROPHILS # BLD AUTO: 1.46 K/UL (ref 1.8–7.7)
NEUTROPHILS NFR BLD AUTO: 26.8 % (ref 53–65)
NRBC # BLD AUTO: 0 X10E3/UL
NRBC, AUTO (.00): 0 %
PLATELET # BLD AUTO: 316 K/UL (ref 150–400)
POTASSIUM SERPL-SCNC: 3.8 MMOL/L (ref 3.5–5.1)
RBC # BLD AUTO: 4.89 M/UL (ref 4.6–6.2)
SODIUM SERPL-SCNC: 137 MMOL/L (ref 136–145)
WBC # BLD AUTO: 5.43 K/UL (ref 4.5–11)

## 2024-01-22 PROCEDURE — 25000003 PHARM REV CODE 250: Performed by: INTERNAL MEDICINE

## 2024-01-22 PROCEDURE — 27000716 HC OXISENSOR PROBE, ANY SIZE: Performed by: ANESTHESIOLOGY

## 2024-01-22 PROCEDURE — 88305 TISSUE EXAM BY PATHOLOGIST: CPT | Mod: TC,SUR | Performed by: SURGERY

## 2024-01-22 PROCEDURE — 63600175 PHARM REV CODE 636 W HCPCS: Mod: JZ,JG | Performed by: INTERNAL MEDICINE

## 2024-01-22 PROCEDURE — 37000008 HC ANESTHESIA 1ST 15 MINUTES: Performed by: SURGERY

## 2024-01-22 PROCEDURE — 27000510 HC BLANKET BAIR HUGGER ANY SIZE: Performed by: ANESTHESIOLOGY

## 2024-01-22 PROCEDURE — 36000707: Performed by: SURGERY

## 2024-01-22 PROCEDURE — 63600175 PHARM REV CODE 636 W HCPCS: Performed by: ANESTHESIOLOGY

## 2024-01-22 PROCEDURE — 88305 TISSUE EXAM BY PATHOLOGIST: CPT | Mod: 26,,, | Performed by: PATHOLOGY

## 2024-01-22 PROCEDURE — 0Y6P0Z1 DETACHMENT AT RIGHT 1ST TOE, HIGH, OPEN APPROACH: ICD-10-PCS | Performed by: SURGERY

## 2024-01-22 PROCEDURE — 25500020 PHARM REV CODE 255: Performed by: INTERNAL MEDICINE

## 2024-01-22 PROCEDURE — 99232 SBSQ HOSP IP/OBS MODERATE 35: CPT | Mod: ,,, | Performed by: INTERNAL MEDICINE

## 2024-01-22 PROCEDURE — 71000033 HC RECOVERY, INTIAL HOUR: Performed by: SURGERY

## 2024-01-22 PROCEDURE — 85025 COMPLETE CBC W/AUTO DIFF WBC: CPT | Performed by: INTERNAL MEDICINE

## 2024-01-22 PROCEDURE — 36000706: Performed by: SURGERY

## 2024-01-22 PROCEDURE — 63600175 PHARM REV CODE 636 W HCPCS: Performed by: NURSE ANESTHETIST, CERTIFIED REGISTERED

## 2024-01-22 PROCEDURE — 83036 HEMOGLOBIN GLYCOSYLATED A1C: CPT | Performed by: INTERNAL MEDICINE

## 2024-01-22 PROCEDURE — 25000003 PHARM REV CODE 250: Performed by: NURSE ANESTHETIST, CERTIFIED REGISTERED

## 2024-01-22 PROCEDURE — D9220A PRA ANESTHESIA: Mod: ,,, | Performed by: ANESTHESIOLOGY

## 2024-01-22 PROCEDURE — 63600175 PHARM REV CODE 636 W HCPCS: Mod: JZ,JG | Performed by: SURGERY

## 2024-01-22 PROCEDURE — 27000655: Performed by: ANESTHESIOLOGY

## 2024-01-22 PROCEDURE — 27000284 HC CANNULA NASAL: Performed by: ANESTHESIOLOGY

## 2024-01-22 PROCEDURE — 63600175 PHARM REV CODE 636 W HCPCS: Performed by: INTERNAL MEDICINE

## 2024-01-22 PROCEDURE — 88311 DECALCIFY TISSUE: CPT | Mod: 26,,, | Performed by: PATHOLOGY

## 2024-01-22 PROCEDURE — 37000009 HC ANESTHESIA EA ADD 15 MINS: Performed by: SURGERY

## 2024-01-22 PROCEDURE — 28825 PARTIAL AMPUTATION OF TOE: CPT | Mod: T5,,, | Performed by: SURGERY

## 2024-01-22 PROCEDURE — 80048 BASIC METABOLIC PNL TOTAL CA: CPT | Performed by: INTERNAL MEDICINE

## 2024-01-22 PROCEDURE — 11000001 HC ACUTE MED/SURG PRIVATE ROOM

## 2024-01-22 PROCEDURE — 27000177 HC AIRWAY, LARYNGEAL MASK: Performed by: ANESTHESIOLOGY

## 2024-01-22 PROCEDURE — A9577 INJ MULTIHANCE: HCPCS | Performed by: INTERNAL MEDICINE

## 2024-01-22 RX ORDER — DIPHENHYDRAMINE HYDROCHLORIDE 50 MG/ML
25 INJECTION INTRAMUSCULAR; INTRAVENOUS EVERY 6 HOURS PRN
Status: DISCONTINUED | OUTPATIENT
Start: 2024-01-22 | End: 2024-01-22 | Stop reason: HOSPADM

## 2024-01-22 RX ORDER — HYDROMORPHONE HYDROCHLORIDE 2 MG/ML
0.5 INJECTION, SOLUTION INTRAMUSCULAR; INTRAVENOUS; SUBCUTANEOUS EVERY 5 MIN PRN
Status: DISCONTINUED | OUTPATIENT
Start: 2024-01-22 | End: 2024-01-22 | Stop reason: HOSPADM

## 2024-01-22 RX ORDER — MEPERIDINE HYDROCHLORIDE 25 MG/ML
25 INJECTION INTRAMUSCULAR; INTRAVENOUS; SUBCUTANEOUS EVERY 10 MIN PRN
Status: DISCONTINUED | OUTPATIENT
Start: 2024-01-22 | End: 2024-01-22 | Stop reason: HOSPADM

## 2024-01-22 RX ORDER — ONDANSETRON HYDROCHLORIDE 2 MG/ML
4 INJECTION, SOLUTION INTRAVENOUS DAILY PRN
Status: DISCONTINUED | OUTPATIENT
Start: 2024-01-22 | End: 2024-01-22 | Stop reason: HOSPADM

## 2024-01-22 RX ORDER — ONDANSETRON HYDROCHLORIDE 2 MG/ML
INJECTION, SOLUTION INTRAVENOUS
Status: DISCONTINUED | OUTPATIENT
Start: 2024-01-22 | End: 2024-01-22

## 2024-01-22 RX ORDER — PROPOFOL 10 MG/ML
INJECTION, EMULSION INTRAVENOUS
Status: DISCONTINUED | OUTPATIENT
Start: 2024-01-22 | End: 2024-01-22

## 2024-01-22 RX ORDER — MIDAZOLAM HYDROCHLORIDE 1 MG/ML
INJECTION INTRAMUSCULAR; INTRAVENOUS
Status: DISCONTINUED | OUTPATIENT
Start: 2024-01-22 | End: 2024-01-22

## 2024-01-22 RX ORDER — BUPIVACAINE HYDROCHLORIDE 2.5 MG/ML
INJECTION, SOLUTION EPIDURAL; INFILTRATION; INTRACAUDAL
Status: DISCONTINUED | OUTPATIENT
Start: 2024-01-22 | End: 2024-01-22 | Stop reason: HOSPADM

## 2024-01-22 RX ORDER — SODIUM CHLORIDE, SODIUM LACTATE, POTASSIUM CHLORIDE, CALCIUM CHLORIDE 600; 310; 30; 20 MG/100ML; MG/100ML; MG/100ML; MG/100ML
125 INJECTION, SOLUTION INTRAVENOUS CONTINUOUS
Status: DISCONTINUED | OUTPATIENT
Start: 2024-01-22 | End: 2024-01-23

## 2024-01-22 RX ORDER — LIDOCAINE HYDROCHLORIDE 20 MG/ML
INJECTION, SOLUTION EPIDURAL; INFILTRATION; INTRACAUDAL; PERINEURAL
Status: DISCONTINUED | OUTPATIENT
Start: 2024-01-22 | End: 2024-01-22

## 2024-01-22 RX ORDER — MORPHINE SULFATE 10 MG/ML
4 INJECTION INTRAMUSCULAR; INTRAVENOUS; SUBCUTANEOUS EVERY 5 MIN PRN
Status: DISCONTINUED | OUTPATIENT
Start: 2024-01-22 | End: 2024-01-22 | Stop reason: HOSPADM

## 2024-01-22 RX ORDER — FENTANYL CITRATE 50 UG/ML
INJECTION, SOLUTION INTRAMUSCULAR; INTRAVENOUS
Status: DISCONTINUED | OUTPATIENT
Start: 2024-01-22 | End: 2024-01-22

## 2024-01-22 RX ORDER — OXYCODONE HYDROCHLORIDE 5 MG/1
5 TABLET ORAL
Status: DISCONTINUED | OUTPATIENT
Start: 2024-01-22 | End: 2024-01-22 | Stop reason: HOSPADM

## 2024-01-22 RX ADMIN — METRONIDAZOLE 500 MG: 5 INJECTION, SOLUTION INTRAVENOUS at 05:01

## 2024-01-22 RX ADMIN — CIPROFLOXACIN 400 MG: 2 INJECTION, SOLUTION INTRAVENOUS at 10:01

## 2024-01-22 RX ADMIN — CIPROFLOXACIN 400 MG: 2 INJECTION, SOLUTION INTRAVENOUS at 11:01

## 2024-01-22 RX ADMIN — ONDANSETRON 4 MG: 2 INJECTION INTRAMUSCULAR; INTRAVENOUS at 02:01

## 2024-01-22 RX ADMIN — GADOBENATE DIMEGLUMINE 15 ML: 529 INJECTION, SOLUTION INTRAVENOUS at 08:01

## 2024-01-22 RX ADMIN — LIDOCAINE HYDROCHLORIDE 60 MG: 20 INJECTION, SOLUTION INTRAVENOUS at 02:01

## 2024-01-22 RX ADMIN — ENOXAPARIN SODIUM 40 MG: 100 INJECTION SUBCUTANEOUS at 07:01

## 2024-01-22 RX ADMIN — SODIUM CHLORIDE: 9 INJECTION, SOLUTION INTRAVENOUS at 02:01

## 2024-01-22 RX ADMIN — HYDROCODONE BITARTRATE AND ACETAMINOPHEN 1 TABLET: 5; 325 TABLET ORAL at 08:01

## 2024-01-22 RX ADMIN — METRONIDAZOLE 500 MG: 5 INJECTION, SOLUTION INTRAVENOUS at 10:01

## 2024-01-22 RX ADMIN — PROPOFOL 200 MG: 10 INJECTION, EMULSION INTRAVENOUS at 02:01

## 2024-01-22 RX ADMIN — VANCOMYCIN HYDROCHLORIDE 1500 MG: 500 INJECTION, POWDER, LYOPHILIZED, FOR SOLUTION INTRAVENOUS at 01:01

## 2024-01-22 RX ADMIN — METRONIDAZOLE 500 MG: 5 INJECTION, SOLUTION INTRAVENOUS at 01:01

## 2024-01-22 RX ADMIN — SODIUM CHLORIDE, POTASSIUM CHLORIDE, SODIUM LACTATE AND CALCIUM CHLORIDE 125 ML/HR: 600; 310; 30; 20 INJECTION, SOLUTION INTRAVENOUS at 05:01

## 2024-01-22 RX ADMIN — MIDAZOLAM HYDROCHLORIDE 2 MG: 1 INJECTION, SOLUTION INTRAMUSCULAR; INTRAVENOUS at 02:01

## 2024-01-22 RX ADMIN — ACETAMINOPHEN 650 MG: 325 TABLET ORAL at 08:01

## 2024-01-22 RX ADMIN — ONDANSETRON 4 MG: 2 INJECTION INTRAMUSCULAR; INTRAVENOUS at 08:01

## 2024-01-22 RX ADMIN — SODIUM CHLORIDE, POTASSIUM CHLORIDE, SODIUM LACTATE AND CALCIUM CHLORIDE: 600; 310; 30; 20 INJECTION, SOLUTION INTRAVENOUS at 02:01

## 2024-01-22 RX ADMIN — MEPERIDINE HYDROCHLORIDE 25 MG: 25 INJECTION, SOLUTION INTRAMUSCULAR; INTRAVENOUS; SUBCUTANEOUS at 06:01

## 2024-01-22 RX ADMIN — VANCOMYCIN HYDROCHLORIDE 1500 MG: 1 INJECTION, POWDER, LYOPHILIZED, FOR SOLUTION INTRAVENOUS at 05:01

## 2024-01-22 RX ADMIN — FENTANYL CITRATE 100 MCG: 50 INJECTION INTRAMUSCULAR; INTRAVENOUS at 02:01

## 2024-01-22 NOTE — PROGRESS NOTES
Pt with orders for Vancomycin 1500mg IV q12hrs.  Pt missed 1400 dose today d/t surgery so we will re-time Vancomycin and it will be restarted at 1800 today.     We will watch renal function closely due to rise in Scr.

## 2024-01-22 NOTE — ASSESSMENT & PLAN NOTE
Bilateral LE arterial duplex showed possible stenosis in popliteal region of LLE.   Lipid panel with .   Outpatient referral to vascular surgery.

## 2024-01-22 NOTE — SUBJECTIVE & OBJECTIVE
Interval History: Patient seen and examined at the bedside, awaiting OR.     Review of Systems   Constitutional:  Positive for fatigue and fever.     Objective:     Vital Signs (Most Recent):  Temp: 98.6 °F (37 °C) (01/21/24 1955)  Pulse: (!) 51 (01/22/24 0351)  Resp: 20 (01/21/24 2146)  BP: 131/67 (01/22/24 0351)  SpO2: 95 % (01/22/24 0351) Vital Signs (24h Range):  Temp:  [98.6 °F (37 °C)] 98.6 °F (37 °C)  Pulse:  [47-67] 51  Resp:  [20] 20  SpO2:  [94 %-95 %] 95 %  BP: ()/(46-76) 131/67     Weight: 77.1 kg (170 lb)  Body mass index is 21.83 kg/m².  No intake or output data in the 24 hours ending 01/22/24 1443      Physical Exam  Vitals and nursing note reviewed.   Constitutional:       Appearance: Normal appearance.   HENT:      Head: Normocephalic and atraumatic.      Nose: Nose normal.      Mouth/Throat:      Mouth: Mucous membranes are moist.   Eyes:      Extraocular Movements: Extraocular movements intact.   Cardiovascular:      Rate and Rhythm: Normal rate and regular rhythm.      Pulses: Normal pulses.      Heart sounds: Normal heart sounds.   Pulmonary:      Effort: Pulmonary effort is normal.      Breath sounds: Normal breath sounds.   Abdominal:      General: Bowel sounds are normal.      Palpations: Abdomen is soft.   Musculoskeletal:      Cervical back: Normal range of motion.      Right foot: Tenderness present. Abnormal pulse.      Left foot: Deformity present.   Skin:     General: Skin is warm.   Neurological:      General: No focal deficit present.      Mental Status: He is alert and oriented to person, place, and time. Mental status is at baseline.   Psychiatric:         Mood and Affect: Mood normal.         Behavior: Behavior normal.             Significant Labs: All pertinent labs within the past 24 hours have been reviewed.    Significant Imaging: I have reviewed all pertinent imaging results/findings within the past 24 hours.

## 2024-01-22 NOTE — HOSPITAL COURSE
1/22- MRI show 5th toe osteo on right foot. Discussed with surgery, plan for 5th toe amputation. Continue IV antibiotics.   1/23: s/p toe amputation. Discussed with Dr. Riggins and he is confident that the margins appeared healthy, he recommends discharge on oral antibiotics. Encourage to quit smoking and discussed careful use of narcotics.

## 2024-01-22 NOTE — ANESTHESIA PREPROCEDURE EVALUATION
01/22/2024  Christian Stuart is a 43 y.o., male.      Pre-op Assessment    I have reviewed the Patient Summary Reports.     I have reviewed the Nursing Notes. I have reviewed the NPO Status.   I have reviewed the Medications.     Review of Systems  Anesthesia Hx:  No problems with previous Anesthesia                Social:  No Alcohol Use, Smoker       Hematology/Oncology:  Hematology Normal   Oncology Normal                                   EENT/Dental:  EENT/Dental Normal           Cardiovascular:               PVD                              Pulmonary:   COPD                     Renal/:  Renal/ Normal                 Hepatic/GI:  Hepatic/GI Normal                 Musculoskeletal:  Musculoskeletal Normal                Neurological:  Neurology Normal                                      Endocrine:  Endocrine Normal            Dermatological:  Skin Normal    Psych:  Psychiatric Normal                    Physical Exam  General: Well nourished    Airway:  Mallampati: II / II  Mouth Opening: Normal  TM Distance: > 6 cm  Tongue: Normal  Neck ROM: Normal ROM    Chest/Lungs:  Clear to auscultation, Normal Respiratory Rate    Heart:  Rate: Normal  Rhythm: Regular Rhythm        Anesthesia Plan  Type of Anesthesia, risks & benefits discussed:    Anesthesia Type: Gen Supraglottic Airway  Intra-op Monitoring Plan: Standard ASA Monitors  Post Op Pain Control Plan: multimodal analgesia  Induction:  IV  Informed Consent: Informed consent signed with the Patient and all parties understand the risks and agree with anesthesia plan.  All questions answered.   ASA Score: 3  Day of Surgery Review of History & Physical: H&P Update referred to the surgeon/provider.I have interviewed and examined the patient. I have reviewed the patient's H&P dated: There are no significant changes. H&P completed by Anesthesiologist.    Ready For  Surgery From Anesthesia Perspective.     .

## 2024-01-22 NOTE — PROGRESS NOTES
Patient had MRI today, have reviewed it with the radiologist.  The distal phalanx of great toe, right foot exhibits changes of osteomyelitis.  The patient remains NPO, and has been since last night.  He would like to proceed with surgery.  Planned surgery is partial amputation of the right great toe to include the distal phalanx.  Risks and benefits include bleeding, poor healing, possible need for further surgery, and the unforeseen.  He understands and wishes to proceed.

## 2024-01-22 NOTE — PLAN OF CARE
[facility]  Initial Discharge Assessment       Primary Care Provider: No, Primary Doctor    Admission Diagnosis: Tobacco dependence [F17.200]  Peripheral vascular disease [I73.9]  Pain [R52]  Foot pain, left [M79.672]  Foot pain, right [M79.671]  Chest pain [R07.9]  Acute hematogenous osteomyelitis of right foot [M86.071]  Other acute osteomyelitis of right foot [M86.171]    Admission Date: 1/20/2024  Expected Discharge Date: 1/24/2024    Transition of Care Barriers: Unisured    Payor: /     Extended Emergency Contact Information  Primary Emergency Contact: Thanh Stuart  Home Phone: [phone]  Mobile Phone: [phone]  Relation: Relative   needed? No    Discharge Plan A: Home with family  Discharge Plan B: Home with family      [facility] - [place], [place] - [id number] [facility] [address]  Phone: [phone] Fax: [phone]    [facility], [place] - [address] [place] [address]  [address] [place] [id number]  Phone: [phone] Fax: [phone]      Initial Assessment (most recent)       Adult Discharge Assessment - 01/22/24 1418          Discharge Assessment    Assessment Type Discharge Planning Assessment     Source of Information patient     Communicated ERASMO with patient/caregiver Date not available/Unable to determine     People in Home --   niece    Do you expect to return to your current living situation? Yes     Prior to hospitilization cognitive status: Alert/Oriented     Current cognitive status: Alert/Oriented     Walking or Climbing Stairs Difficulty yes     Walking or Climbing Stairs ambulation difficulty, requires equipment     Mobility Management cane     Dressing/Bathing Difficulty no     Home Accessibility wheelchair accessible     Home Layout Able to live on 1st floor     Equipment Currently Used at Home cane, straight     Patient currently being followed by outpatient case management? No     Do you currently  have service(s) that help you manage your care at home? No     Do you take prescription medications? No     Do you have prescription coverage? No     Coverage self pay     Do you have any problems affording any of your prescribed medications? TBD     Who is going to help you get home at discharge? family     How do you get to doctors appointments? car, drives self;family or friend will provide     Are you on dialysis? No     Do you take coumadin? No     Discharge Plan A Home with family     Discharge Plan B Home with family     DME Needed Upon Discharge  none     Discharge Plan discussed with: Patient     Transition of Care Barriers Unisured        Physical Activity    On average, how many days per week do you engage in moderate to strenuous exercise (like a brisk walk)? 0 days     On average, how many minutes do you engage in exercise at this level? 0 min        Financial Resource Strain    How hard is it for you to pay for the very basics like food, housing, medical care, and heating? Not hard at all        Housing Stability    In the last 12 months, how many places have you lived? 1     In the last 12 months, was there a time when you did not have a steady place to sleep or slept in a shelter (including now)? No        Transportation Needs    In the past 12 months, has lack of transportation kept you from medical appointments or from getting medications? No     In the past 12 months, has lack of transportation kept you from meetings, work, or from getting things needed for daily living? No        Food Insecurity    Within the past 12 months, you worried that your food would run out before you got the money to buy more. Never true     Within the past 12 months, the food you bought just didn't last and you didn't have money to get more. Never true        Stress    Do you feel stress - tense, restless, nervous, or anxious, or unable to sleep at night because your mind is troubled all the time - these days? Not at  all        Social Connections    In a typical week, how many times do you talk on the phone with family, friends, or neighbors? More than three times a week     How often do you get together with friends or relatives? More than three times a week     How often do you attend Orthodoxy or Temple services? Never     Do you belong to any clubs or organizations such as Orthodoxy groups, unions, fraternal or athletic groups, or school groups? No     How often do you attend meetings of the clubs or organizations you belong to? Never     Are you , , , , never , or living with a partner? Never         Alcohol Use    Q1: How often do you have a drink containing alcohol? Never     Q2: How many drinks containing alcohol do you have on a typical day when you are drinking? Patient does not drink     Q3: How often do you have six or more drinks on one occasion? Never                          SS spoke with pt in the ER. Pt lives at home with his niece. Pt does not have insurance listed, SS will give pt free clinic application. Pt was also wondering if he may can possibly get disability. Informed pt to follow up with a primary. SS will follow for discharge needs.

## 2024-01-22 NOTE — ANESTHESIA POSTPROCEDURE EVALUATION
Anesthesia Post Evaluation    Patient: Christian Stuart    Procedure(s) Performed: Procedure(s) (LRB):  AMPUTATION, TOE (Right)    Final Anesthesia Type: general      Patient location during evaluation: PACU  Patient participation: Yes- Able to Participate  Level of consciousness: awake and sedated  Post-procedure vital signs: reviewed and stable  Pain management: adequate  Airway patency: patent    PONV status at discharge: No PONV  Anesthetic complications: no      Cardiovascular status: blood pressure returned to baseline  Respiratory status: unassisted  Hydration status: euvolemic  Follow-up not needed.              Vitals Value Taken Time   /89 01/22/24 1645   Temp 36.6 °C (97.9 °F) 01/22/24 1523   Pulse 84 01/22/24 1651   Resp 18 01/22/24 1645   SpO2 88 % 01/22/24 1651   Vitals shown include unvalidated device data.      No case tracking events are documented in the log.      Pain/Sydni Score: Pain Rating Prior to Med Admin: 8 (1/21/2024  9:46 PM)  Pain Rating Post Med Admin: 3 (1/21/2024 10:46 PM)  Sydni Score: 10 (1/22/2024  4:20 PM)

## 2024-01-22 NOTE — ANESTHESIA PROCEDURE NOTES
Intubation    Date/Time: 1/22/2024 2:25 PM    Performed by: Garcia Ashley CRNA  Authorized by: Stanislav Paredes MD    Intubation:     Induction:  Intravenous    Intubated:  Postinduction    Mask Ventilation:  Easy mask    Attempts:  1    Attempted By:  CRNA    Difficult Airway Encountered?: No      Complications:  None    Airway Device:  Supraglottic airway/LMA    Airway Device Size:  4.0    Style/Cuff Inflation:  Cuffed (inflated to minimal occlusive pressure)    Placement Verified By:  Capnometry    Complicating Factors:  None    Findings Post-Intubation:  BS equal bilateral and atraumatic/condition of teeth unchanged

## 2024-01-22 NOTE — PROGRESS NOTES
[facility] Hospital Medicine  Progress Note    Patient Name: Christian Stuart  MRN: [id number]  Patient Class: IP- Inpatient   Admission Date: 1/20/2024  Length of Stay: 1 days  Attending Physician: Blake Wu MD  Primary Care Provider: Jacqueline, Primary Doctor        Subjective:     Principal Problem:Acute hematogenous osteomyelitis of right foot        HPI:  Mr. Stuart is a 43 Y O male with PMH of tobacco dependence, left foot OM s/p TMA in 2022 in [place], [place] region, who presented to ED with the complain of right foot pain with discoloration of his toes. He noted right foot pain, mostly on putting weight on it around 3-4 weeks ago when he went to [facility] and was prescribed PO antibiotics x 1 week. He completed the course but his symptoms did not improve. Over the last 2 weeks he has noted darkening/discoloration of his toes. He describes pin and needles sensation in both feet. He reports subjective fever, denies chills. No chest pain, shortness of breath. He has history of left foot OM which eventually resulted in transmetatarsal amputation in [place], [place] in 2022. He denies having any blockages of blood vessels in lower extremities, no history of diabetes. No history of drug use, he smokes around 6-8 cigarettes daily.     Overview/Hospital Course:  1/22- MRI show 5th toe osteo on right foot. Discussed with surgery, plan for 5th toe amputation. Continue IV antibiotics.     Interval History: Patient seen and examined at the bedside, awaiting OR.     Review of Systems   Constitutional:  Positive for fatigue and fever.     Objective:     Vital Signs (Most Recent):  Temp: 98.6 °F (37 °C) (01/21/24 1955)  Pulse: (!) 51 (01/22/24 0351)  Resp: 20 (01/21/24 2146)  BP: 131/67 (01/22/24 0351)  SpO2: 95 % (01/22/24 0351) Vital Signs (24h Range):  Temp:  [98.6 °F (37 °C)] 98.6 °F (37 °C)  Pulse:  [47-67] 51  Resp:  [20] 20  SpO2:  [94 %-95 %] 95 %  BP: ()/(46-76) 131/67     Weight: 77.1 kg (170  lb)  Body mass index is 21.83 kg/m².  No intake or output data in the 24 hours ending 01/22/24 1443      Physical Exam  Vitals and nursing note reviewed.   Constitutional:       Appearance: Normal appearance.   HENT:      Head: Normocephalic and atraumatic.      Nose: Nose normal.      Mouth/Throat:      Mouth: Mucous membranes are moist.   Eyes:      Extraocular Movements: Extraocular movements intact.   Cardiovascular:      Rate and Rhythm: Normal rate and regular rhythm.      Pulses: Normal pulses.      Heart sounds: Normal heart sounds.   Pulmonary:      Effort: Pulmonary effort is normal.      Breath sounds: Normal breath sounds.   Abdominal:      General: Bowel sounds are normal.      Palpations: Abdomen is soft.   Musculoskeletal:      Cervical back: Normal range of motion.      Right foot: Tenderness present. Abnormal pulse.      Left foot: Deformity present.   Skin:     General: Skin is warm.   Neurological:      General: No focal deficit present.      Mental Status: He is alert and oriented to person, place, and time. Mental status is at baseline.   Psychiatric:         Mood and Affect: Mood normal.         Behavior: Behavior normal.             Significant Labs: All pertinent labs within the past 24 hours have been reviewed.    Significant Imaging: I have reviewed all pertinent imaging results/findings within the past 24 hours.    Assessment/Plan:      * Acute hematogenous osteomyelitis of right foot  Presented with right foot pain and discoloration of toes x 3-4 weeks.  No leukocytosis/fever or other SIRS criteria met on admission.   X-ray showed changes suggestive of osteomyelitis involving 1st, 3rd and 5th digit.  MRI foot shows osteo in just 5th toe.   HBA1C 5.5.   Inflammatory markers WNL.  Blood cultures pending.   General surgery consulted; plan for 5th toe amputation in OR today.  Follow OR cultures/path.    Started on IV vancomycin, ciprofloxacin and metronidazole (allergy to penicillin).   Pain  control.         Peripheral vascular disease  Bilateral LE arterial duplex showed possible stenosis in popliteal region of LLE.   Lipid panel with .   Outpatient referral to vascular surgery.         Does not have health insurance  SW consulted.   Could be a barrier if required to have long term IV antibiotics.       Tobacco dependence  Dangers of cigarette smoking were reviewed with patient in detail. Patient was Counseled for 3-10 minutes. Nicotine replacement options were discussed. Nicotine replacement was discussed- not prescribed per patient's request      VTE Risk Mitigation (From admission, onward)           Ordered     enoxaparin injection 40 mg  Daily         01/21/24 1222     IP VTE HIGH RISK PATIENT  Once         01/21/24 1222     Place sequential compression device  Until discontinued         01/21/24 1222                    Discharge Planning   ERASMO: 1/26/2024     Code Status: Full Code   Is the patient medically ready for discharge?:     Reason for patient still in hospital (select all that apply): Patient trending condition and Consult recommendations  Discharge Plan A: Home with family                  ADDI WINKLER MD  Department of Hospital Medicine   [facility]

## 2024-01-22 NOTE — TRANSFER OF CARE
"Anesthesia Transfer of Care Note    Patient: Christian Stuart    Procedure(s) Performed: Procedure(s) (LRB):  AMPUTATION, TOE (Right)    Patient location: PACU    Anesthesia Type: general    Transport from OR: Transported from OR on 2-3 L/min O2 by NC with adequate spontaneous ventilation    Post pain: adequate analgesia    Post assessment: no apparent anesthetic complications and tolerated procedure well    Post vital signs: stable    Level of consciousness: responds to stimulation    Nausea/Vomiting: no nausea/vomiting    Complications: none    Transfer of care protocol was followed      Last vitals: Visit Vitals  BP (!) 103/57 (BP Location: Right arm, Patient Position: Lying)   Pulse 82   Temp 36.6 °C (97.9 °F) (Oral)   Resp 16   Ht 6' 2" (1.88 m)   Wt 77.1 kg (170 lb)   SpO2 98%   BMI 21.83 kg/m²     "

## 2024-01-22 NOTE — ASSESSMENT & PLAN NOTE
Presented with right foot pain and discoloration of toes x 3-4 weeks.  No leukocytosis/fever or other SIRS criteria met on admission.   X-ray showed changes suggestive of osteomyelitis involving 1st, 3rd and 5th digit.  MRI foot shows osteo in just 5th toe.   HBA1C 5.5.   Inflammatory markers WNL.  Blood cultures pending.   General surgery consulted; plan for 5th toe amputation in OR today.  Follow OR cultures/path.    Started on IV vancomycin, ciprofloxacin and metronidazole (allergy to penicillin).   Pain control.

## 2024-01-23 VITALS
HEIGHT: 74 IN | HEART RATE: 67 BPM | TEMPERATURE: 98 F | RESPIRATION RATE: 16 BRPM | OXYGEN SATURATION: 96 % | SYSTOLIC BLOOD PRESSURE: 115 MMHG | DIASTOLIC BLOOD PRESSURE: 61 MMHG | BODY MASS INDEX: 21.86 KG/M2 | WEIGHT: 170.31 LBS

## 2024-01-23 LAB
ANION GAP SERPL CALCULATED.3IONS-SCNC: 6 MMOL/L (ref 7–16)
BASOPHILS # BLD AUTO: 0.02 K/UL (ref 0–0.2)
BASOPHILS NFR BLD AUTO: 0.3 % (ref 0–1)
BUN SERPL-MCNC: 11 MG/DL (ref 7–18)
BUN/CREAT SERPL: 8 (ref 6–20)
CALCIUM SERPL-MCNC: 8.2 MG/DL (ref 8.5–10.1)
CHLORIDE SERPL-SCNC: 108 MMOL/L (ref 98–107)
CO2 SERPL-SCNC: 28 MMOL/L (ref 21–32)
CREAT SERPL-MCNC: 1.4 MG/DL (ref 0.7–1.3)
DIFFERENTIAL METHOD BLD: ABNORMAL
EGFR (NO RACE VARIABLE) (RUSH/TITUS): 64 ML/MIN/1.73M2
EOSINOPHIL # BLD AUTO: 0.17 K/UL (ref 0–0.5)
EOSINOPHIL NFR BLD AUTO: 2.3 % (ref 1–4)
ERYTHROCYTE [DISTWIDTH] IN BLOOD BY AUTOMATED COUNT: 14 % (ref 11.5–14.5)
GLUCOSE SERPL-MCNC: 92 MG/DL (ref 74–106)
HCT VFR BLD AUTO: 40.8 % (ref 40–54)
HGB BLD-MCNC: 13.1 G/DL (ref 13.5–18)
IMM GRANULOCYTES # BLD AUTO: 0.01 K/UL (ref 0–0.04)
IMM GRANULOCYTES NFR BLD: 0.1 % (ref 0–0.4)
LYMPHOCYTES # BLD AUTO: 3.03 K/UL (ref 1–4.8)
LYMPHOCYTES NFR BLD AUTO: 40.3 % (ref 27–41)
MCH RBC QN AUTO: 28 PG (ref 27–31)
MCHC RBC AUTO-ENTMCNC: 32.1 G/DL (ref 32–36)
MCV RBC AUTO: 87.2 FL (ref 80–96)
MONOCYTES # BLD AUTO: 0.53 K/UL (ref 0–0.8)
MONOCYTES NFR BLD AUTO: 7 % (ref 2–6)
MPC BLD CALC-MCNC: 9.8 FL (ref 9.4–12.4)
NEUTROPHILS # BLD AUTO: 3.76 K/UL (ref 1.8–7.7)
NEUTROPHILS NFR BLD AUTO: 50 % (ref 53–65)
NRBC # BLD AUTO: 0 X10E3/UL
NRBC, AUTO (.00): 0 %
PLATELET # BLD AUTO: 314 K/UL (ref 150–400)
POTASSIUM SERPL-SCNC: 3.4 MMOL/L (ref 3.5–5.1)
RBC # BLD AUTO: 4.68 M/UL (ref 4.6–6.2)
SODIUM SERPL-SCNC: 139 MMOL/L (ref 136–145)
WBC # BLD AUTO: 7.52 K/UL (ref 4.5–11)

## 2024-01-23 PROCEDURE — 63600175 PHARM REV CODE 636 W HCPCS: Performed by: INTERNAL MEDICINE

## 2024-01-23 PROCEDURE — 85025 COMPLETE CBC W/AUTO DIFF WBC: CPT | Performed by: INTERNAL MEDICINE

## 2024-01-23 PROCEDURE — 99239 HOSP IP/OBS DSCHRG MGMT >30: CPT | Mod: ,,, | Performed by: INTERNAL MEDICINE

## 2024-01-23 PROCEDURE — 63600175 PHARM REV CODE 636 W HCPCS: Performed by: ANESTHESIOLOGY

## 2024-01-23 PROCEDURE — 63600175 PHARM REV CODE 636 W HCPCS: Mod: JZ,JG | Performed by: INTERNAL MEDICINE

## 2024-01-23 PROCEDURE — 25000003 PHARM REV CODE 250: Performed by: INTERNAL MEDICINE

## 2024-01-23 PROCEDURE — 80048 BASIC METABOLIC PNL TOTAL CA: CPT | Performed by: INTERNAL MEDICINE

## 2024-01-23 RX ORDER — IBUPROFEN 200 MG
1 TABLET ORAL DAILY
Qty: 30 PATCH | Refills: 0 | Status: SHIPPED | OUTPATIENT
Start: 2024-01-23

## 2024-01-23 RX ORDER — SODIUM CHLORIDE, SODIUM LACTATE, POTASSIUM CHLORIDE, CALCIUM CHLORIDE 600; 310; 30; 20 MG/100ML; MG/100ML; MG/100ML; MG/100ML
INJECTION, SOLUTION INTRAVENOUS CONTINUOUS
Status: DISCONTINUED | OUTPATIENT
Start: 2024-01-23 | End: 2024-01-23 | Stop reason: HOSPADM

## 2024-01-23 RX ORDER — MORPHINE SULFATE 2 MG/ML
2 INJECTION, SOLUTION INTRAMUSCULAR; INTRAVENOUS EVERY 4 HOURS PRN
Status: DISCONTINUED | OUTPATIENT
Start: 2024-01-23 | End: 2024-01-23 | Stop reason: HOSPADM

## 2024-01-23 RX ORDER — HYDROCODONE BITARTRATE AND ACETAMINOPHEN 5; 325 MG/1; MG/1
1 TABLET ORAL EVERY 6 HOURS PRN
Qty: 20 TABLET | Refills: 0 | Status: SHIPPED | OUTPATIENT
Start: 2024-01-23 | End: 2024-01-28

## 2024-01-23 RX ORDER — POTASSIUM CHLORIDE 20 MEQ/1
40 TABLET, EXTENDED RELEASE ORAL ONCE
Status: COMPLETED | OUTPATIENT
Start: 2024-01-23 | End: 2024-01-23

## 2024-01-23 RX ORDER — CIPROFLOXACIN 500 MG/1
500 TABLET ORAL EVERY 12 HOURS
Qty: 20 TABLET | Refills: 0 | Status: SHIPPED | OUTPATIENT
Start: 2024-01-23 | End: 2024-02-02

## 2024-01-23 RX ADMIN — VANCOMYCIN HYDROCHLORIDE 1500 MG: 1 INJECTION, POWDER, LYOPHILIZED, FOR SOLUTION INTRAVENOUS at 06:01

## 2024-01-23 RX ADMIN — METRONIDAZOLE 500 MG: 5 INJECTION, SOLUTION INTRAVENOUS at 04:01

## 2024-01-23 RX ADMIN — SODIUM CHLORIDE, POTASSIUM CHLORIDE, SODIUM LACTATE AND CALCIUM CHLORIDE 125 ML/HR: 600; 310; 30; 20 INJECTION, SOLUTION INTRAVENOUS at 04:01

## 2024-01-23 RX ADMIN — CIPROFLOXACIN 400 MG: 2 INJECTION, SOLUTION INTRAVENOUS at 09:01

## 2024-01-23 RX ADMIN — MORPHINE SULFATE 2 MG: 2 INJECTION, SOLUTION INTRAMUSCULAR; INTRAVENOUS at 12:01

## 2024-01-23 RX ADMIN — SODIUM CHLORIDE, POTASSIUM CHLORIDE, SODIUM LACTATE AND CALCIUM CHLORIDE: 600; 310; 30; 20 INJECTION, SOLUTION INTRAVENOUS at 09:01

## 2024-01-23 RX ADMIN — POTASSIUM CHLORIDE 40 MEQ: 1500 TABLET, EXTENDED RELEASE ORAL at 09:01

## 2024-01-23 NOTE — PLAN OF CARE
[facility]  Discharge Final Note    Primary Care Provider: No, Primary Doctor    Expected Discharge Date: 1/23/2024    Final Discharge Note (most recent)       Final Note - 01/23/24 1014          Final Note    Assessment Type Final Discharge Note     Anticipated Discharge Disposition Home or Self Care                     Important Message from Medicare             Contact Info       [facility]   Specialty: Emergency Medicine    [address]  [facility] [address]   Phone: [phone]       Next Steps: Follow up    Instructions: As needed    Sky Riggins MD   Specialty: General Surgery, Surgery    [address] [facility] [address]   Phone: [phone]       Next Steps: Follow up on 2/12/2024    Brittany Felix MD   Specialty: Family Medicine    [address] [facility] [address]   Phone: [phone]       Next Steps: Schedule an appointment as soon as possible for a visit on 2/12/2024        SS took pt free clinic of [place] application and also took [facility] application.

## 2024-01-23 NOTE — OP NOTE
[facility]     Operative Note    SUMMARY     Date of Procedure: 1/22/2024     Procedure: Procedure(s) (LRB):  AMPUTATION, TOE (Right)     Surgeon(s) and Role:     * Sky Riggins MD - Primary    Assisting Surgeon: None    Pre-Operative Diagnosis: Pain [R52]  Other acute osteomyelitis of right foot [M86.171]    Post-Operative Diagnosis: Post-Op Diagnosis Codes:     * Pain [R52]     * Other acute osteomyelitis of right foot [M86.171]    Anesthesia: General    Technical Procedures Used:  Taken to the operating room placed supine position.  General anesthesia was administered per anesthesia staff.  The right foot was prepped and draped standard sterile fashion.  [place] incision was performed over the right great toe distal aspect.  Dissection was carried down to the level of the proximal phalanx using cautery.  Proximal phalanx was transected immediately proximal to the joint with bone cutters.  Remaining soft tissue attachments were then transected with cautery.  The wound was then irrigated, and hemostasis was assured.  Closed using interrupted horizontal mattress nylon followed simple interrupted nylon sutures.  Dry dressing was applied, the patient was awakened from anesthesia in stable condition.  Needle sponge instrument counts were reported as correct at the end of the case.    Description of the Findings of the Procedure:  Amputation was performed immediately proximal to the interphalangeal joint.  Closure was performed using nylon suture.    Assistant(s):      Complications: No    Estimated Blood Loss (EBL): 25cc           Implants: * No implants in log *    Specimens:  distal phalanx, right great toe  Specimen (24h ago, onward)       Start     Ordered    01/22/24 8560  Surgical Pathology  RELEASE UPON ORDERING         01/22/24 1724                     Condition: Stable      Complications:  None

## 2024-01-23 NOTE — DISCHARGE SUMMARY
[facility] - Short Stay Unit  Hospital Medicine  Discharge Summary      Patient Name: Christian Stuart  MRN: [id number]  BAM: [id number]  Patient Class: IP- Inpatient  Admission Date: 1/20/2024  Hospital Length of Stay: 2 days  Discharge Date and Time:  01/23/2024 9:52 AM  Attending Physician: Blake Wu MD   Discharging Provider: BLAKE WU MD  Primary Care Provider: Jacqueline, Primary Doctor    Primary Care Team: Networked reference to record PCT     HPI:   Mr. Stuart is a 43 Y O male with PMH of tobacco dependence, left foot OM s/p TMA in 2022 in [place], [place] region, who presented to ED with the complain of right foot pain with discoloration of his toes. He noted right foot pain, mostly on putting weight on it around 3-4 weeks ago when he went to [facility] and was prescribed PO antibiotics x 1 week. He completed the course but his symptoms did not improve. Over the last 2 weeks he has noted darkening/discoloration of his toes. He describes pin and needles sensation in both feet. He reports subjective fever, denies chills. No chest pain, shortness of breath. He has history of left foot OM which eventually resulted in transmetatarsal amputation in [place], [place] in 2022. He denies having any blockages of blood vessels in lower extremities, no history of diabetes. No history of drug use, he smokes around 6-8 cigarettes daily.     Procedure(s) (LRB):  AMPUTATION, TOE (Right)      Hospital Course:   1/22- MRI show 5th toe osteo on right foot. Discussed with surgery, plan for 5th toe amputation. Continue IV antibiotics.   1/23: s/p toe amputation. Discussed with Dr. Riggins and he is confident that the margins appeared healthy, he recommends discharge on oral antibiotics. Encourage to quit smoking and discussed careful use of narcotics.      Goals of Care Treatment Preferences:  Code Status: Full Code      Consults:   Consults (From admission, onward)          Status Ordering Provider     Pharmacy to dose Vancomycin  consult  Once        Provider:  (Not yet assigned)    Acknowledged ADDI WINKLER     Inpatient consult to General surgery  Once        Provider:  Sky Riggins MD    Completed CHYNA VANEGAS.            Cardiac/Vascular  Peripheral vascular disease  Bilateral LE arterial duplex showed possible stenosis in popliteal region of LLE.   Lipid panel with .   Outpatient referral to vascular surgery.         ID  * Acute hematogenous osteomyelitis of right foot  Presented with right foot pain and discoloration of toes x 3-4 weeks.  No leukocytosis/fever or other SIRS criteria met on admission.   X-ray showed changes suggestive of osteomyelitis involving 1st, 3rd and 5th digit.  MRI foot shows osteo in just 5th toe.   HBA1C 5.5.   Inflammatory markers WNL.  Blood cultures NGTD.   General surgery consulted; s/p 5th toe amputation on 1/23- pathology pending.   Started on IV vancomycin, ciprofloxacin and metronidazole (allergy to penicillin) while here, transition to PO doxycycline and ciprofloxacin for discharge per discussion with Dr. Riggins.  Follow up with Dr. Riggins in 2-3 weeks.           Other  Does not have health insurance  SW consulted.   Patient reports he will have his insurance starting on Feb 1st 2024.       Tobacco dependence  Dangers of cigarette smoking were reviewed with patient in detail. Patient was Counseled for 3-10 minutes. Nicotine replacement options were discussed. Nicotine replacement was discussed- not prescribed per patient's request      Final Active Diagnoses:    Diagnosis Date Noted POA    PRINCIPAL PROBLEM:  Acute hematogenous osteomyelitis of right foot [M86.071] 01/21/2024 Yes    Peripheral vascular disease [I73.9] 01/21/2024 Yes    Does not have health insurance [Z59.89] 01/22/2024 Not Applicable    Tobacco dependence [F17.200] 01/21/2024 Yes      Problems Resolved During this Admission:       Discharged Condition: good    Disposition: Home or Self Care    Follow Up:   Follow-up  Information       [facility] .    Specialty: Emergency Medicine  Why: As needed  Contact information:  [address] [facility] [address] [phone]             Sky Riggins MD Follow up on 2/12/2024.    Specialties: General Surgery, Surgery  Contact information:  [address] [facility] [address]  [phone]               Brittany Felix MD. Schedule an appointment as soon as possible for a visit on 2/12/2024.    Specialty: Family Medicine  Contact information:  [address] [facility] [address]  [phone]                           Patient Instructions:      Ambulatory referral/consult to Vascular Surgery   Standing Status: Future   Referral Priority: Routine Referral Type: Consultation   Referral Reason: Specialty Services Required   Referred to Provider: STEWART MCKAY Requested Specialty: Vascular Surgery   Number of Visits Requested: 1     Ambulatory referral/consult to Smoking Cessation Program   Standing Status: Future   Referral Priority: Routine Referral Type: Consultation   Referral Reason: Specialty Services Required   Requested Specialty: CTTS   Number of Visits Requested: 1     Change dressing   Order Comments: Leave dressing intact until Thursday.  Instruct patient to apply polysporin or bacitracin ointment to incision once daily, beginning on Thursday.  Cover with dry gauze and tape.       Significant Diagnostic Studies: Labs: BMP:   Recent Labs   Lab 01/22/24  0424 01/23/24  0320   GLU 93 92    139   K 3.8 3.4*    108*   CO2 31 28   BUN 14 11   CREATININE 1.24 1.40*   CALCIUM 8.3* 8.2*    and CBC   Recent Labs   Lab 01/22/24  0424 01/23/24  0320   WBC 5.43 7.52   HGB 13.8 13.1*   HCT 42.9 40.8    314       Pending Diagnostic Studies:       Procedure Component Value Units Date/Time    EXTRA TUBES [[id number]] Collected: 01/20/24 2919    Order Status: Sent Lab Status: In process Updated: 01/21/24 0000    Specimen:  Blood, Venous     Narrative:      The following orders were created for panel order EXTRA TUBES.  Procedure                               Abnormality         Status                     ---------                               -----------         ------                     Light Green Top Hold[[id number]]                            In process                   Please view results for these tests on the individual orders.    Surgical Pathology [[id number]] Collected: 01/22/24 8839    Order Status: Sent Lab Status: In process Updated: 01/22/24 1362    Specimen: Tissue from Toe, Right Foot            Medications:  Reconciled Home Medications:      Medication List        START taking these medications      ciprofloxacin HCl 500 MG tablet  Commonly known as: CIPRO  Take 1 tablet (500 mg total) by mouth every 12 (twelve) hours. for 10 days     doxycycline 100 MG Cap  Commonly known as: VIBRAMYCIN  Take 1 capsule (100 mg total) by mouth 2 (two) times daily. for 10 days     HYDROcodone-acetaminophen 5-325 mg per tablet  Commonly known as: NORCO  Take 1 tablet by mouth every 6 (six) hours as needed for Pain.     nicotine 21 mg/24 hr  Commonly known as: NICODERM CQ  Place 1 patch onto the skin once daily.              Indwelling Lines/Drains at time of discharge:   Lines/Drains/Airways       None                   Time spent on the discharge of patient: 32 minutes         ADDI WINKLER MD  Department of Hospital Medicine  [facility] Short Stay [facility]

## 2024-01-23 NOTE — ASSESSMENT & PLAN NOTE
Presented with right foot pain and discoloration of toes x 3-4 weeks.  No leukocytosis/fever or other SIRS criteria met on admission.   X-ray showed changes suggestive of osteomyelitis involving 1st, 3rd and 5th digit.  MRI foot shows osteo in just 5th toe.   HBA1C 5.5.   Inflammatory markers WNL.  Blood cultures NGTD.   General surgery consulted; s/p 5th toe amputation on 1/23- pathology pending.   Started on IV vancomycin, ciprofloxacin and metronidazole (allergy to penicillin) while here, transition to PO doxycycline and ciprofloxacin for discharge per discussion with Dr. Riggins.  Follow up with Dr. Riggins in 2-3 weeks.

## 2024-01-25 LAB
ESTROGEN SERPL-MCNC: NORMAL PG/ML
INSULIN SERPL-ACNC: NORMAL U[IU]/ML
LAB AP GROSS DESCRIPTION: NORMAL
LAB AP LABORATORY NOTES: NORMAL
T3RU NFR SERPL: NORMAL %

## 2024-01-27 LAB
BACTERIA BLD CULT: NORMAL
BACTERIA BLD CULT: NORMAL

## 2024-02-12 ENCOUNTER — OFFICE VISIT (OUTPATIENT)
Dept: SURGERY | Facility: CLINIC | Age: 44
End: 2024-02-12
Attending: SURGERY
Payer: COMMERCIAL

## 2024-02-12 DIAGNOSIS — Z09 POSTOP CHECK: Primary | ICD-10-CM

## 2024-02-12 DIAGNOSIS — M79.672 FOOT PAIN, LEFT: ICD-10-CM

## 2024-02-12 DIAGNOSIS — M79.671 FOOT PAIN, RIGHT: ICD-10-CM

## 2024-02-12 PROCEDURE — 99024 POSTOP FOLLOW-UP VISIT: CPT | Mod: ,,, | Performed by: SURGERY

## 2024-02-12 PROCEDURE — 3044F HG A1C LEVEL LT 7.0%: CPT | Mod: CPTII,,, | Performed by: SURGERY

## 2024-02-12 PROCEDURE — 99213 OFFICE O/P EST LOW 20 MIN: CPT | Mod: PBBFAC | Performed by: SURGERY

## 2024-02-12 NOTE — PATIENT INSTRUCTIONS
We have placed referrals for you. If you would like to check on the status of your referral, you can reach the [facility] at [phone].   No referrals are handled by our office directly.

## 2024-02-12 NOTE — PROGRESS NOTES
Subjective:       Patient ID: Christian Stuart is a 43 y.o. male.    Chief Complaint: Post-op Evaluation    44 y/o male without diabetes, has required left TMA for infection, done in [place] awhile ago, but recently presented to [facility] where a partial right great toe amputation was performed due to osteomyelitis.  Patient continues to report pain in both feet.  He states it feels like a numbness and tingling but with sharp and burning pains accompanying it.  He reports that he was getting progressively worse.        family history is not on file.  No past medical history on file.   Past Surgical History:   Procedure Laterality Date    TOE AMPUTATION Right 1/22/2024    Procedure: AMPUTATION, TOE;  Surgeon: Sky Riggins MD;  Location: [facility] OR;  Service: General;  Laterality: Right;            Review of Systems   All other systems reviewed and are negative.         Objective:      There were no vitals taken for this visit.   Physical Exam  Cardiovascular:      Rate and Rhythm: Normal rate.      Comments: Pedal and posterior tibial pulses palpable  Pulmonary:      Effort: Pulmonary effort is normal.   Abdominal:      Palpations: Abdomen is soft.   Musculoskeletal:         General: No swelling.   Skin:     Comments: Right great toe amp site well healed.  Sutures removed and Steri-Strips were placed.   Neurological:      Mental Status: He is alert.      Sensory: Sensory deficit present.   Psychiatric:         Mood and Affect: Mood normal.           Assessment/Plan:         Postop check    Foot pain, left  -     Ambulatory referral/consult to Neurology; Future; Expected date: 02/19/2024    Foot pain, right  -     Ambulatory referral/consult to Neurology; Future; Expected date: 02/19/2024         Problem List Items Addressed This Visit          Orthopedic    Foot pain, right    Overview     Neuropathic         Current Assessment & Plan     Appointment with Neurology         Relevant Orders    Ambulatory referral/consult to  Neurology    Foot pain, left    Relevant Orders    Ambulatory referral/consult to Neurology       Other    Postop check - Primary    Current Assessment & Plan     F/u with me prn

## 2024-02-16 NOTE — OR NURSING
1518 Rec'd pt to PACU asleep with no distress noted, respirations even and unlabored. VSS. Right toe dressing C/D/I. No needs. Will continue to monitor.     1618 Awaiting bed placement. Will hold in PACU. Pt denies any family present at hospital. Awake and alert with no distress noted. VSS. Small sips of clear liquid given, pt tolerated well.     1650 LEXY Boss RN assumed care of pt at this time. Bedside report given.   
1900 ASSUMED CARE OF POST OP PT WAITING ON A BED ON 4 EAST. PT IS AWAKE & TALKING ON THE PHONE WITH FAMILY. NO DISTRESS NOTED @ THIS TIME. VS STABLE    2030 PT REQUEST PAIN MEDS= SEE MAR    2105 TRANSFERRED PT TO ROOM  IN STABLE COND. BEDSIDE REPORT GIVEN TO CHICHO CROSS. NO DISTRESS NOTED @ THIS TIME. VS STABLE  98%  62  119/84  
178

## 2024-02-18 ENCOUNTER — HOSPITAL ENCOUNTER (EMERGENCY)
Facility: HOSPITAL | Age: 44
Discharge: HOME | End: 2024-02-18
Payer: COMMERCIAL

## 2024-02-18 VITALS
RESPIRATION RATE: 18 BRPM | DIASTOLIC BLOOD PRESSURE: 79 MMHG | HEART RATE: 99 BPM | TEMPERATURE: 99 F | OXYGEN SATURATION: 98 % | BODY MASS INDEX: 21.31 KG/M2 | SYSTOLIC BLOOD PRESSURE: 138 MMHG | WEIGHT: 166 LBS

## 2024-02-18 DIAGNOSIS — Z89.411 HISTORY OF AMPUTATION OF RIGHT GREAT TOE: ICD-10-CM

## 2024-02-18 DIAGNOSIS — M79.2 NEURALGIA OF LEFT FOOT: Primary | ICD-10-CM

## 2024-02-18 DIAGNOSIS — Z89.432 HISTORY OF AMPUTATION OF LEFT FOOT: ICD-10-CM

## 2024-02-18 DIAGNOSIS — I73.9 PVD (PERIPHERAL VASCULAR DISEASE): ICD-10-CM

## 2024-02-18 DIAGNOSIS — M79.2 NEURALGIA OF RIGHT FOOT: ICD-10-CM

## 2024-02-18 PROCEDURE — 96372 THER/PROPH/DIAG INJ SC/IM: CPT | Performed by: NURSE PRACTITIONER

## 2024-02-18 PROCEDURE — 63600175 PHARM REV CODE 636 W HCPCS: Performed by: NURSE PRACTITIONER

## 2024-02-18 PROCEDURE — 99284 EMERGENCY DEPT VISIT MOD MDM: CPT | Mod: 25

## 2024-02-18 PROCEDURE — 99284 EMERGENCY DEPT VISIT MOD MDM: CPT | Mod: ,,, | Performed by: NURSE PRACTITIONER

## 2024-02-18 RX ORDER — ORPHENADRINE CITRATE 30 MG/ML
60 INJECTION INTRAMUSCULAR; INTRAVENOUS
Status: COMPLETED | OUTPATIENT
Start: 2024-02-18 | End: 2024-02-18

## 2024-02-18 RX ADMIN — ORPHENADRINE CITRATE 60 MG: 60 INJECTION INTRAMUSCULAR; INTRAVENOUS at 11:02

## 2024-02-19 NOTE — ED PROVIDER NOTES
Encounter Date: 2/18/2024       History     Chief Complaint   Patient presents with    Foot Burn     Pov to er - c/o donn feet pain - multiple amp noted - states seen at both [facility] - pain meds not helping - reeks ok smoke smell     Patient presents to the ER and states he was brought by an ambulance.  Patient actually walked through the front door of the ER.  Complains of bilateral foot pain.  Patient with  previous foot and partial amputation of the left foot, and more recent amputation of the right great toe.  Wound looks to be well healed.  He tells me his most recent surgery was done at [facility] a couple of weeks ago.  He states he was had significant pain since the surgery.  He states he followed up with the doctor did not do anything to help his pain.  He states he can not wear socks because the socks touching his foot causes him pain.  He denies injury or accident.  Patient was history of peripheral vascular disease.  He also has past medical history of diabetes listed in his chart but he denies this.  When chart is reviewed it was discovered that patient was in the hospital here a couple of weeks ago and Dr. Riggins amputated his right great toe.  When questioned about this patient it was confused.  When asked if he was saw Dr. Riggins in his clinic last week he said yes.  When asked if he was appointment tomorrow with Neurology he agrees.  He states he needs something for the pain in his legs and feet.  He states the pain is causing muscle spasms.  He states the pain is so bad he was unable to walk on his feet.  He describes the pain as a burning-type sensation in the bottom of both of his feet.    The history is provided by the patient. No  was used.     Review of patient's allergies indicates:   Allergen Reactions    Penicillins Hives     Past Medical History:   Diagnosis Date    Diabetes mellitus      Past Surgical History:   Procedure Laterality Date    AMPUTATION, LOWER LIMB Bilateral      TOE AMPUTATION Right 01/22/2024    Procedure: AMPUTATION, TOE;  Surgeon: Sky Riggins MD;  Location: [facility];  Service: General;  Laterality: Right;     History reviewed. No pertinent family history.  Social History     Tobacco Use    Smoking status: Every Day     Types: Cigarettes    Smokeless tobacco: Never   Substance Use Topics    Drug use: Yes     Types: Marijuana     Review of Systems   Constitutional:  Positive for activity change.   Musculoskeletal:  Positive for arthralgias and myalgias.        Bilateral foot pain   Neurological:  Positive for numbness.   Psychiatric/Behavioral:  Positive for confusion.    All other systems reviewed and are negative.      Physical Exam     Initial Vitals [02/18/24 2243]   BP Pulse Resp Temp SpO2   138/79 99 18 98.7 °F (37.1 °C) 98 %      MAP       --         Physical Exam    Nursing note and vitals reviewed.  Constitutional: He appears well-developed and well-nourished.   HENT:   Head: Normocephalic.   Nose: Nose normal.   Mouth/Throat: Oropharynx is clear and moist.   Neck:   Normal range of motion.  Cardiovascular:  Normal rate and intact distal pulses.           DP pulses palpated 1+ on bilateral lower extremities.   Abdominal: Abdomen is soft. Bowel sounds are normal.   Musculoskeletal:         General: Tenderness (Bilateral feet) present. No edema. Normal range of motion.      Cervical back: Normal range of motion.     Neurological: He is alert and oriented to person, place, and time. He has normal strength.   Skin: Skin is warm. Capillary refill takes less than 2 seconds.   Patient has strong smell of marijuana on his person in clothing.   Psychiatric: He has a normal mood and affect.         Medical Screening Exam   See Full Note    ED Course   Procedures  Labs Reviewed - No data to display       Imaging Results    None          Medications   orphenadrine injection 60 mg (60 mg Intramuscular Given 2/18/24 5517)     Medical Decision Making  Patient presents to  the ER and states he was brought by an ambulance.  Patient actually walked through the front door of the ER.  Complains of bilateral foot pain.  Patient with  previous foot and partial amputation of the left foot, and more recent amputation of the right great toe.  Wound looks to be well healed.  He tells me his most recent surgery was done at [facility] a couple of weeks ago.  He states he was had significant pain since the surgery.  He states he followed up with the doctor did not do anything to help his pain.  He states he can not wear socks because the socks touching his foot causes him pain.  He denies injury or accident.  Patient was history of peripheral vascular disease.  He also has past medical history of diabetes listed in his chart but he denies this.  When chart is reviewed it was discovered that patient was in the hospital here a couple of weeks ago and Dr. Riggins amputated his right great toe.  When questioned about this patient it was confused.  When asked if he was saw Dr. Riggins in his clinic last week he said yes.  When asked if he was appointment tomorrow with Neurology he agrees.  He states he needs something for the pain in his legs and feet.  He states the pain is causing muscle spasms.  He reports the pain is so severe he was unable to walk on his feet.  He describes the pain as a burning-type sensation in the bottom of both feet.      Amount and/or Complexity of Data Reviewed  Discussion of management or test interpretation with external provider(s): Norflex 60 mg IM to treat muscle spasms of right lower extremity.    Ace wraps to bilateral feet and walking boots to bilateral feet to assist with walking.      Patient was discharged from the ER with diagnosis of neuralgia left and right foot, peripheral vascular disease history of amputation of left foot and history of amputation of right great toe.  He was instructed to continue his current medications as previously prescribed.  He was told to  keep his scheduled appointment tomorrow with Neurology.  Patient verbalizes understanding.    Risk  Prescription drug management.                                      Clinical Impression:   Final diagnoses:  [M79.2] Neuralgia of left foot (Primary)  [M79.2] Neuralgia of right foot  [I73.9] PVD (peripheral vascular disease)  [Z89.432] History of amputation of left foot  [Z89.411] History of amputation of right great toe        ED Disposition Condition    Discharge Stable          ED Prescriptions    None       Follow-up Information    None          Denisha Hopkins, KENIAP  02/19/24 0043

## 2024-02-19 NOTE — DISCHARGE INSTRUCTIONS
Wear Ace wrap and hard-soled she was for comfort.  Keep scheduled follow up appointment with neurology tomorrow.  Return to the ER with new or worsening symptoms.

## 2024-05-13 PROBLEM — Z09 POSTOP CHECK: Status: RESOLVED | Noted: 2024-02-12 | Resolved: 2024-05-13

## (undated) DEVICE — Device

## (undated) DEVICE — SYR 10CC LUER LOCK

## (undated) DEVICE — GLOVE PROTEXIS PI SYN SURG 7

## (undated) DEVICE — GLOVE 6.5 PROTEXIS PI BLUE

## (undated) DEVICE — ELECTRODE BLADE INSULATED 1 IN

## (undated) DEVICE — GLOVE 7.0 PROTEXIS PI BLUE

## (undated) DEVICE — NDL ECLIPSE SAF REG 25GX1IN

## (undated) DEVICE — GLOVE PROTEXIS PI SYN SURG 6.5

## (undated) DEVICE — GLOVE 8.5 PROTEXIS PI BLUE

## (undated) DEVICE — BANDAGE GAUZE COT STRL 4.5X4.1

## (undated) DEVICE — SOL NACL IRR 1000ML BTL

## (undated) DEVICE — SUT ETHILON 2-0 FS 18IN BLK

## (undated) DEVICE — GLOVE PROTEXIS PI SYN SURG 8.0

## (undated) DEVICE — GLOVE PROTEXIS PI SYN SURG 7.5

## (undated) DEVICE — APPLICATOR CHLORAPREP ORN 26ML